# Patient Record
Sex: FEMALE | Race: AMERICAN INDIAN OR ALASKA NATIVE | Employment: OTHER | URBAN - METROPOLITAN AREA
[De-identification: names, ages, dates, MRNs, and addresses within clinical notes are randomized per-mention and may not be internally consistent; named-entity substitution may affect disease eponyms.]

---

## 2017-02-03 ENCOUNTER — CLINICAL SUPPORT (OUTPATIENT)
Dept: INTERNAL MEDICINE CLINIC | Age: 72
End: 2017-02-03

## 2017-02-03 ENCOUNTER — HOSPITAL ENCOUNTER (OUTPATIENT)
Dept: LAB | Age: 72
Discharge: HOME | End: 2017-02-03
Payer: MEDICARE

## 2017-02-03 DIAGNOSIS — Z20.818 MRSA EXPOSURE: Primary | ICD-10-CM

## 2017-02-03 PROCEDURE — 87081 CULTURE SCREEN ONLY: CPT

## 2017-02-03 NOTE — PROGRESS NOTES
Per vo SRJ, pt swabbed for MRSA. Pt mother is inpatient @[facility] and tested positive. Pt  is currently having chemo, and pt has MS. Refer to My Chart messages as well.

## 2017-02-06 LAB — MRSA SPEC QL CULT: NEGATIVE

## 2017-03-03 ENCOUNTER — HOSPITAL ENCOUNTER (EMERGENCY)
Age: 72
Discharge: HOME | End: 2017-03-03
Attending: EMERGENCY MEDICINE
Payer: MEDICARE

## 2017-03-03 ENCOUNTER — APPOINTMENT (OUTPATIENT)
Dept: GENERAL RADIOLOGY | Age: 72
End: 2017-03-03
Attending: EMERGENCY MEDICINE
Payer: MEDICARE

## 2017-03-03 VITALS
TEMPERATURE: 98.3 F | DIASTOLIC BLOOD PRESSURE: 58 MMHG | WEIGHT: 153 LBS | HEIGHT: 63 IN | SYSTOLIC BLOOD PRESSURE: 137 MMHG | RESPIRATION RATE: 18 BRPM | BODY MASS INDEX: 27.11 KG/M2 | HEART RATE: 70 BPM | OXYGEN SATURATION: 97 %

## 2017-03-03 DIAGNOSIS — J11.1 INFLUENZA-LIKE ILLNESS: Primary | ICD-10-CM

## 2017-03-03 DIAGNOSIS — E86.0 DEHYDRATION: ICD-10-CM

## 2017-03-03 LAB
ALBUMIN SERPL BCP-MCNC: 4 G/DL (ref 3.5–5)
ALBUMIN/GLOB SERPL: 1 {RATIO} (ref 1.1–2.2)
ALP SERPL-CCNC: 107 U/L (ref 45–117)
ALT SERPL-CCNC: 48 U/L (ref 12–78)
ANION GAP BLD CALC-SCNC: 7 MMOL/L (ref 5–15)
APPEARANCE UR: CLEAR
AST SERPL W P-5'-P-CCNC: 39 U/L (ref 15–37)
BACTERIA URNS QL MICRO: NEGATIVE /HPF
BASOPHILS # BLD AUTO: 0 K/UL (ref 0–0.1)
BASOPHILS # BLD: 0 % (ref 0–1)
BILIRUB SERPL-MCNC: 0.7 MG/DL (ref 0.2–1)
BILIRUB UR QL: NEGATIVE
BUN SERPL-MCNC: 11 MG/DL (ref 6–20)
BUN/CREAT SERPL: 14 (ref 12–20)
CALCIUM SERPL-MCNC: 9.3 MG/DL (ref 8.5–10.1)
CHLORIDE SERPL-SCNC: 101 MMOL/L (ref 97–108)
CO2 SERPL-SCNC: 24 MMOL/L (ref 21–32)
COLOR UR: ABNORMAL
CREAT SERPL-MCNC: 0.76 MG/DL (ref 0.55–1.02)
EOSINOPHIL # BLD: 0 K/UL (ref 0–0.4)
EOSINOPHIL NFR BLD: 0 % (ref 0–7)
EPITH CASTS URNS QL MICRO: ABNORMAL /LPF
ERYTHROCYTE [DISTWIDTH] IN BLOOD BY AUTOMATED COUNT: 12.7 % (ref 11.5–14.5)
GLOBULIN SER CALC-MCNC: 4 G/DL (ref 2–4)
GLUCOSE SERPL-MCNC: 103 MG/DL (ref 65–100)
GLUCOSE UR STRIP.AUTO-MCNC: NEGATIVE MG/DL
HCT VFR BLD AUTO: 40.2 % (ref 35–47)
HGB BLD-MCNC: 13.6 G/DL (ref 11.5–16)
HGB UR QL STRIP: NEGATIVE
KETONES UR QL STRIP.AUTO: 15 MG/DL
LACTATE SERPL-SCNC: 0.9 MMOL/L (ref 0.4–2)
LEUKOCYTE ESTERASE UR QL STRIP.AUTO: ABNORMAL
LIPASE SERPL-CCNC: 72 U/L (ref 73–393)
LYMPHOCYTES # BLD AUTO: 19 % (ref 12–49)
LYMPHOCYTES # BLD: 1.3 K/UL (ref 0.8–3.5)
MCH RBC QN AUTO: 29.4 PG (ref 26–34)
MCHC RBC AUTO-ENTMCNC: 33.8 G/DL (ref 30–36.5)
MCV RBC AUTO: 86.8 FL (ref 80–99)
MONOCYTES # BLD: 0.2 K/UL (ref 0–1)
MONOCYTES NFR BLD AUTO: 4 % (ref 5–13)
NEUTS SEG # BLD: 5.1 K/UL (ref 1.8–8)
NEUTS SEG NFR BLD AUTO: 77 % (ref 32–75)
NITRITE UR QL STRIP.AUTO: NEGATIVE
PH UR STRIP: 6.5 [PH] (ref 5–8)
PLATELET # BLD AUTO: 233 K/UL (ref 150–400)
POTASSIUM SERPL-SCNC: 4.1 MMOL/L (ref 3.5–5.1)
PROT SERPL-MCNC: 8 G/DL (ref 6.4–8.2)
PROT UR STRIP-MCNC: NEGATIVE MG/DL
RBC # BLD AUTO: 4.63 M/UL (ref 3.8–5.2)
RBC #/AREA URNS HPF: ABNORMAL /HPF (ref 0–5)
SODIUM SERPL-SCNC: 132 MMOL/L (ref 136–145)
SP GR UR REFRACTOMETRY: 1.02 (ref 1–1.03)
UROBILINOGEN UR QL STRIP.AUTO: 1 EU/DL (ref 0.2–1)
WBC # BLD AUTO: 6.7 K/UL (ref 3.6–11)
WBC URNS QL MICRO: ABNORMAL /HPF (ref 0–4)

## 2017-03-03 PROCEDURE — 85025 COMPLETE CBC W/AUTO DIFF WBC: CPT | Performed by: EMERGENCY MEDICINE

## 2017-03-03 PROCEDURE — 81001 URINALYSIS AUTO W/SCOPE: CPT | Performed by: EMERGENCY MEDICINE

## 2017-03-03 PROCEDURE — 87040 BLOOD CULTURE FOR BACTERIA: CPT | Performed by: EMERGENCY MEDICINE

## 2017-03-03 PROCEDURE — 74011250637 HC RX REV CODE- 250/637: Performed by: EMERGENCY MEDICINE

## 2017-03-03 PROCEDURE — 71020 XR CHEST PA LAT: CPT

## 2017-03-03 PROCEDURE — 83690 ASSAY OF LIPASE: CPT | Performed by: EMERGENCY MEDICINE

## 2017-03-03 PROCEDURE — 96361 HYDRATE IV INFUSION ADD-ON: CPT

## 2017-03-03 PROCEDURE — 36415 COLL VENOUS BLD VENIPUNCTURE: CPT | Performed by: EMERGENCY MEDICINE

## 2017-03-03 PROCEDURE — 74011250636 HC RX REV CODE- 250/636: Performed by: EMERGENCY MEDICINE

## 2017-03-03 PROCEDURE — 96374 THER/PROPH/DIAG INJ IV PUSH: CPT

## 2017-03-03 PROCEDURE — 83605 ASSAY OF LACTIC ACID: CPT | Performed by: EMERGENCY MEDICINE

## 2017-03-03 PROCEDURE — 99284 EMERGENCY DEPT VISIT MOD MDM: CPT

## 2017-03-03 PROCEDURE — 80053 COMPREHEN METABOLIC PANEL: CPT | Performed by: EMERGENCY MEDICINE

## 2017-03-03 RX ORDER — ONDANSETRON 8 MG/1
8 TABLET, ORALLY DISINTEGRATING ORAL
Qty: 10 TAB | Refills: 0 | Status: SHIPPED | OUTPATIENT
Start: 2017-03-03 | End: 2017-04-20 | Stop reason: ALTCHOICE

## 2017-03-03 RX ORDER — ACETAMINOPHEN 325 MG/1
975 TABLET ORAL
Status: COMPLETED | OUTPATIENT
Start: 2017-03-03 | End: 2017-03-03

## 2017-03-03 RX ORDER — OSELTAMIVIR PHOSPHATE 75 MG/1
75 CAPSULE ORAL 2 TIMES DAILY
Qty: 9 CAP | Refills: 0 | Status: SHIPPED | OUTPATIENT
Start: 2017-03-03 | End: 2017-04-20 | Stop reason: ALTCHOICE

## 2017-03-03 RX ORDER — OSELTAMIVIR PHOSPHATE 75 MG/1
75 CAPSULE ORAL
Status: COMPLETED | OUTPATIENT
Start: 2017-03-03 | End: 2017-03-03

## 2017-03-03 RX ORDER — IBUPROFEN 600 MG/1
600 TABLET ORAL
Status: COMPLETED | OUTPATIENT
Start: 2017-03-03 | End: 2017-03-03

## 2017-03-03 RX ORDER — ONDANSETRON 2 MG/ML
4 INJECTION INTRAMUSCULAR; INTRAVENOUS
Status: COMPLETED | OUTPATIENT
Start: 2017-03-03 | End: 2017-03-03

## 2017-03-03 RX ADMIN — ONDANSETRON 4 MG: 2 INJECTION INTRAMUSCULAR; INTRAVENOUS at 20:41

## 2017-03-03 RX ADMIN — SODIUM CHLORIDE 1000 ML: 900 INJECTION, SOLUTION INTRAVENOUS at 19:22

## 2017-03-03 RX ADMIN — ACETAMINOPHEN 975 MG: 325 TABLET, FILM COATED ORAL at 18:10

## 2017-03-03 RX ADMIN — OSELTAMIVIR PHOSPHATE 75 MG: 75 CAPSULE ORAL at 20:39

## 2017-03-03 RX ADMIN — IBUPROFEN 600 MG: 600 TABLET, FILM COATED ORAL at 19:22

## 2017-03-03 NOTE — ED PROVIDER NOTES
HPI Comments: 80 y.o. female with past medical history significant for Hypertension, GERD, Asthma, and Osteopenia who presents from Home with chief complaint of Evaluation for Fever. Patient states onset yesterday of cold like symptoms described as fever, cough, and generalized body aches. Pt states gradually worsening symptoms since onset with accompanying vomiting (x1 episode), productive cough of \"cloudy\" \"yellow\" sputum, exertional shortness of breath, and fever of 101.8F. Pt states she has been taking Ibuprofen 800 mg for symptoms - last dose yesterday with no relief. Pt states onset today of rib tenderness that she attributes to constant cough. Pt states aggravation with movement of her trunk as well. Patient states three days ago she had an Avonex Shot. Pt states she often experiences \"flu like symptoms\" after getting this injection however, patient states symptoms presented today are worse and she also has a fever. Pt states recent contact with illness described as \"her  was sick earlier this week\". Pt denies getting Flu shot this year. Pt denies  abdominal pain, nausea, diarrhea, difficulty urinating, or dysuria. Pt denies any other acute medical complaints. Prior to her  getting sick with same sx, their daughter visited last week with same sx. There are no other acute medical concerns at this time. PCP: Gunnar Person MD    Note written by Padmini Kwok, as dictated by Stephany Diggs DO 6:57 PM    The history is provided by the patient.         Past Medical History:   Diagnosis Date    Arthritis 1980    Asthma 1996    Autoimmune disease ([address] 2001    Carpal tunnel syndrome 4/13/2009    GERD (gastroesophageal reflux disease) 4/13/2009    Hyperlipemia 4/13/2009    Hypertension 4/13/2009    MS (multiple sclerosis) ([address] 1/1/2001    MS (multiple sclerosis) ([address]     Osteopenia 4/13/2009       Past Surgical History:   Procedure Laterality Date    HX HYSTERECTOMY  HX LUMBAR DISKECTOMY      HX ORTHOPAEDIC  1976    GA REMOVAL OF OVARIAN CYST(S)           Family History:   Problem Relation Age of Onset    Hypertension Mother     High Cholesterol Mother     Cancer Father      prostate    Asthma Father     High Cholesterol Sister     Cancer Brother      prostate    High Cholesterol Brother     High Cholesterol Sister     Cancer Brother        Social History     Social History    Marital status:      Spouse name: N/A    Number of children: N/A    Years of education: N/A     Occupational History    Not on file. Social History Main Topics    Smoking status: Never Smoker    Smokeless tobacco: Never Used    Alcohol use Yes      Comment: rarely - occ    Drug use: No    Sexual activity: Yes     Partners: Male     Other Topics Concern    Not on file     Social History Narrative         ALLERGIES: Augmentin [amoxicillin-pot clavulanate]; Sulfa (sulfonamide antibiotics); and Zithromax [azithromycin]    Review of Systems   Constitutional: Positive for fever. Respiratory: Positive for cough. Gastrointestinal: Positive for vomiting. Negative for abdominal pain, diarrhea and nausea. Musculoskeletal: Positive for arthralgias (Generalized). All other systems reviewed and are negative. Vitals:    03/03/17 1753   BP: 135/76   Pulse: (!) 101   Resp: 18   Temp: (!) 101.1 °F (38.4 °C)   SpO2: 95%   Weight: 69.4 kg (153 lb)   Height: 5' 3\" (1.6 m)            Physical Exam    Constitutional: Pt is awake and alert. Pt appears well-developed and well-nourished. NAD. HENT:   Head: Normocephalic and atraumatic. Nose: Nose normal.   Mouth/Throat: Oropharynx is clear and moist. No oropharyngeal exudate. Eyes: Conjunctivae and extraocular motions are normal. Pupils are equal, round, and reactive to light. Right eye exhibits no discharge. Left eye exhibits no discharge. No scleral icterus. Neck: No tracheal deviation present.  Supple neck.  Cardiovascular: Normal rate, regular rhythm, normal heart sounds and intact distal pulses. Exam reveals no gallop and no friction rub. No murmur heard. Pulmonary/Chest: Effort normal and breath sounds normal.  Pt  has no wheezes. Pt  has no rales. Abdominal: Soft. Pt  exhibits no distension and no mass. No tenderness. Pt  has no rebound and no guarding. Musculoskeletal:  Pt  exhibits no edema and no tenderness. Ext: Normal ROM in all four extremities; not tender to palpation; distal pulses are normal, no edema. Neurological:  Pt is alert. nonfocal neuro exam.  Skin: Skin is warm and dry. Pt  is not diaphoretic. Psychiatric:  Pt  has a normal mood and affect. Behavior is normal.   Note written by Pop Mcclure, as dictated by Nate Adams, DO 6:58 PM    [facility]    ED Course       Procedures      PROGRESS NOTE:7:24 PM  Provider reviewed chest xray results which were negative          Labs Reviewed   CBC WITH AUTOMATED DIFF - Abnormal; Notable for the following:        Result Value    NEUTROPHILS 77 (*)     MONOCYTES 4 (*)     All other components within normal limits   METABOLIC PANEL, COMPREHENSIVE - Abnormal; Notable for the following:     Sodium 132 (*)     Glucose 103 (*)     AST (SGOT) 39 (*)     A-G Ratio 1.0 (*)     All other components within normal limits   URINALYSIS W/ RFLX MICROSCOPIC - Abnormal; Notable for the following:     Ketone 15 (*)     Leukocyte Esterase SMALL (*)     All other components within normal limits   LIPASE - Abnormal; Notable for the following:     Lipase 72 (*)     All other components within normal limits   CULTURE, BLOOD, PAIRED   LACTIC ACID, PLASMA   SAMPLES BEING HELD     Improved here  Doubt bacterial infection    At [place], pt feels \"100% better. \"  She smiled, did not vomit and looked better too.     Likely has influenza  Emailed her PCP  Plan is empiric tamiflu bc of her age and presence of MS.  zofran prn ODT    D/w pt and her    he is on chemo but got sick prior to her  He is better

## 2017-03-04 NOTE — ED NOTES
MD reviewed discharge instructions and options with patient and patient verbalized understanding. RN reviewed discharge instructions using teachback method. Pt ambulated to exit without difficulty and in no signs of acute distress escorted by fiona, and he  will drive home. No complaints or needs expressed at this time. Patient was counseled on medications prescribed at discharge. Patient to call Dr. Yomaira Camilo in the morning for appointment.

## 2017-03-04 NOTE — DISCHARGE INSTRUCTIONS
We hope that we have addressed all of your medical concerns. The examination and treatment you received in the Emergency Department were for an emergent problem and were not intended as complete care. It is important that you follow up with your healthcare provider(s) for ongoing care. If your symptoms worsen or do not improve as expected, and you are unable to reach your usual health care provider(s), you should return to the Emergency Department. Today's healthcare is undergoing tremendous change, and patient satisfaction surveys are one of the many tools to assess the quality of medical care. You may receive a survey from the [employer] regarding your experience in the Emergency Department. I hope that your experience has been completely positive, particularly the medical care that I provided. As such, please participate in the survey; anything less than excellent does not meet my expectations or intentions. [facility] and [facility] participate in nationally recognized quality of care measures. If your blood pressure is greater than 120/80, as reported below, we urge that you seek medical care to address the potential of high blood pressure, commonly known as hypertension. Hypertension can be hereditary or can be caused by certain medical conditions, pain, stress, or \"white coat syndrome. \"       Please make an appointment with your health care provider(s) for follow up of your Emergency Department visit. VITALS:   Patient Vitals for the past 8 hrs:   Temp Pulse Resp BP SpO2   03/03/17 2144 98.3 °F (36.8 °C) 70 18 137/58 97 %   03/03/17 2007 98.8 °F (37.1 °C) 78 20 150/79 97 %   03/03/17 1922 99.2 °F (37.3 °C) 74 16 134/61 97 %   03/03/17 1753 (!) 101.1 °F (38.4 °C) (!) 101 18 135/76 95 %          Thank you for allowing us to provide you with medical care today.   We realize that you have many choices for your emergency care needs. Please choose us in the future for any continued health care needs. Regards,           Maryanne ErickDO    [facility] [address]   Office: [phone]            Recent Results (from the past 24 hour(s))   CBC WITH AUTOMATED DIFF    Collection Time: 03/03/17  6:07 PM   Result Value Ref Range    WBC 6.7 3.6 - 11.0 K/uL    RBC 4.63 3.80 - 5.20 M/uL    HGB 13.6 11.5 - 16.0 g/dL    HCT 40.2 35.0 - 47.0 %    MCV 86.8 80.0 - 99.0 FL    MCH 29.4 26.0 - 34.0 PG    MCHC 33.8 30.0 - 36.5 g/dL    RDW 12.7 11.5 - 14.5 %    PLATELET 251 729 - 337 K/uL    NEUTROPHILS 77 (H) 32 - 75 %    LYMPHOCYTES 19 12 - 49 %    MONOCYTES 4 (L) 5 - 13 %    EOSINOPHILS 0 0 - 7 %    BASOPHILS 0 0 - 1 %    ABS. NEUTROPHILS 5.1 1.8 - 8.0 K/UL    ABS. LYMPHOCYTES 1.3 0.8 - 3.5 K/UL    ABS. MONOCYTES 0.2 0.0 - 1.0 K/UL    ABS. EOSINOPHILS 0.0 0.0 - 0.4 K/UL    ABS. BASOPHILS 0.0 0.0 - 0.1 K/UL   METABOLIC PANEL, COMPREHENSIVE    Collection Time: 03/03/17  6:07 PM   Result Value Ref Range    Sodium 132 (L) 136 - 145 mmol/L    Potassium 4.1 3.5 - 5.1 mmol/L    Chloride 101 97 - 108 mmol/L    CO2 24 21 - 32 mmol/L    Anion gap 7 5 - 15 mmol/L    Glucose 103 (H) 65 - 100 mg/dL    BUN 11 6 - 20 MG/DL    Creatinine 0.76 0.55 - 1.02 MG/DL    BUN/Creatinine ratio 14 12 - 20      GFR est AA >60 >60 ml/min/1.73m2    GFR est non-AA >60 >60 ml/min/1.73m2    Calcium 9.3 8.5 - 10.1 MG/DL    Bilirubin, total 0.7 0.2 - 1.0 MG/DL    ALT (SGPT) 48 12 - 78 U/L    AST (SGOT) 39 (H) 15 - 37 U/L    Alk.  phosphatase 107 45 - 117 U/L    Protein, total 8.0 6.4 - 8.2 g/dL    Albumin 4.0 3.5 - 5.0 g/dL    Globulin 4.0 2.0 - 4.0 g/dL    A-G Ratio 1.0 (L) 1.1 - 2.2     LACTIC ACID, PLASMA    Collection Time: 03/03/17  6:07 PM   Result Value Ref Range    Lactic acid 0.9 0.4 - 2.0 MMOL/L   LIPASE    Collection Time: 03/03/17  6:07 PM   Result Value Ref Range    Lipase 72 (L) 73 - 393 U/L   URINALYSIS W/ RFLX MICROSCOPIC    Collection Time: 03/03/17 8:07 PM   Result Value Ref Range    Color YELLOW/STRAW      Appearance CLEAR CLEAR      Specific gravity 1.023 1.003 - 1.030      pH (UA) 6.5 5.0 - 8.0      Protein NEGATIVE  NEG mg/dL    Glucose NEGATIVE  NEG mg/dL    Ketone 15 (A) NEG mg/dL    Bilirubin NEGATIVE  NEG      Blood NEGATIVE  NEG      Urobilinogen 1.0 0.2 - 1.0 EU/dL    Nitrites NEGATIVE  NEG      Leukocyte Esterase SMALL (A) NEG      WBC 0-4 0 - 4 /hpf    RBC 0-5 0 - 5 /hpf    Epithelial cells FEW FEW /lpf    Bacteria NEGATIVE  NEG /hpf       Xr Chest Pa Lat    Result Date: 3/3/2017  CLINICAL HISTORY: Cough, fever INDICATION: Cough COMPARISON: 2016 FINDINGS: PA and lateral views of the chest are obtained. The cardiopericardial silhouette is within normal limits. There is no pleural effusion, pneumothorax or focal consolidation present. IMPRESSION: No acute intrathoracic disease. Dehydration: Care Instructions  Your Care Instructions  Dehydration happens when your body loses too much fluid. This might happen when you do not drink enough water or you lose large amounts of fluids from your body because of diarrhea, vomiting, or sweating. Severe dehydration can be life-threatening. Water and minerals called electrolytes help put your body fluids back in balance. Learn the early signs of fluid loss, and drink more fluids to prevent dehydration. Follow-up care is a key part of your treatment and safety. Be sure to make and go to all appointments, and call your doctor if you are having problems. It's also a good idea to know your test results and keep a list of the medicines you take. How can you care for yourself at home? · To prevent dehydration, drink plenty of fluids, enough so that your urine is light yellow or clear like water. Choose water and other caffeine-free clear liquids until you feel better.  If you have kidney, heart, or liver disease and have to limit fluids, talk with your doctor before you increase the amount of fluids you drink. · If you do not feel like eating or drinking, try taking small sips of water, sports drinks, or other rehydration drinks. · Get plenty of rest.  To prevent dehydration  · Add more fluids to your diet and daily routine, unless your doctor has told you not to. · During hot weather, drink more fluids. Drink even more fluids if you exercise a lot. Stay away from drinks with alcohol or caffeine. · Watch for the symptoms of dehydration. These include:  ¨ A dry, sticky mouth. ¨ Dark yellow urine, and not much of it. ¨ Dry and sunken eyes. ¨ Feeling very tired. · Learn what problems can lead to dehydration. These include:  ¨ Diarrhea, fever, and vomiting. ¨ Any illness with a fever, such as pneumonia or the flu. ¨ Activities that cause heavy sweating, such as endurance races and heavy outdoor work in hot or humid weather. ¨ Alcohol or drug abuse or withdrawal.  ¨ Certain medicines, such as cold and allergy pills (antihistamines), diet pills (diuretics), and laxatives. ¨ Certain diseases, such as diabetes, cancer, and heart or kidney disease. When should you call for help? Call 911 anytime you think you may need emergency care. For example, call if:  · You passed out (lost consciousness). Call your doctor now or seek immediate medical care if:  · You are confused and cannot think clearly. · You are dizzy or lightheaded, or you feel like you may faint. · You have signs of needing more fluids. You have sunken eyes and a dry mouth, and you pass only a little dark urine. · You cannot keep fluids down. Watch closely for changes in your health, and be sure to contact your doctor if:  · You are not making tears. · Your skin is very dry and sags slowly back into place after you pinch it. · Your mouth and eyes are very dry. Where can you learn more? Go to http://natalie-hosea.info/. Enter Q861 in the search box to learn more about \"Dehydration: Care Instructions. \"  Current as of: May 27, 2016  Content Version: 11.1  © 2006-2016 [employer]. Care instructions adapted under license by [employer] (which disclaims liability or warranty for this information). If you have questions about a medical condition or this instruction, always ask your healthcare professional. [address] any warranty or liability for your use of this information. Oral Rehydration: Care Instructions  Your Care Instructions  Dehydration occurs when your body loses too much water. This can happen if you do not drink enough fluids or lose a lot of fluid due to diarrhea, vomiting, or sweating. Being dehydrated can cause health problems and can even be life-threatening. To replace lost fluids, you need to drink liquid that contains special chemicals called electrolytes. Electrolytes keep your body working well. Plain water does not have electrolytes. You also need to rest to prevent more fluid loss. Replacing water and electrolytes (oral rehydration) completely takes about 36 hours. But you should feel better within a few hours. Follow-up care is a key part of your treatment and safety. Be sure to make and go to all appointments, and call your doctor if you are having problems. It's also a good idea to know your test results and keep a list of the medicines you take. How can you care for yourself at home? · Take frequent sips of a drink such as Gatorade, Powerade, or other rehydration drinks that your doctor suggests. These replace both fluid and important chemicals (electrolytes) you need for balance in your blood. · Drink 2 quarts of cool liquid over 2 to 4 hours. You should have at least 10 glasses of liquid a day to replace lost fluid. If you have kidney, heart, or liver disease and have to limit fluids, talk with your doctor before you increase the amount of fluids you drink. · Make your own drink. Measure everything carefully.  The drink may not work well or may even be harmful if the amounts are off. ¨ 1 quart water  ¨ ½ teaspoon salt  ¨ 6 teaspoons sugar  · Do not drink liquid with caffeine, such as coffee and isreal. · Do not drink any alcohol. It can make you dehydrated. · Drink plenty of fluids, enough so that your urine is light yellow or clear like water. If you have kidney, heart, or liver disease and have to limit fluids, talk with your doctor before you increase the amount of fluids you drink. When should you call for help? Call 911 anytime you think you may need emergency care. For example, call if:  · You have signs of severe dehydration, such as:  ¨ You are confused or unable to stay awake. ¨ You passed out (lost consciousness). Call your doctor now or seek immediate medical care if:  · You still have signs of dehydration. You have sunken eyes and a dry mouth, and you pass only a little dark urine. · You are dizzy or lightheaded, or you feel like you may faint. · You are not able to keep down fluids. Watch closely for changes in your health, and be sure to contact your doctor if:  · You do not get better as expected. Where can you learn more? Go to http://natalie-hosea.info/. Enter I040 in the search box to learn more about \"Oral Rehydration: Care Instructions. \"  Current as of: May 27, 2016  Content Version: 11.1  © 6357-9893 [employer]. Care instructions adapted under license by [employer] (which disclaims liability or warranty for this information). If you have questions about a medical condition or this instruction, always ask your healthcare professional. [place] [address] any warranty or liability for your use of this information. Influenza (Flu): Care Instructions  Your Care Instructions  Influenza (flu) is an infection in the lungs and breathing passages. It is caused by the influenza virus. There are different strains, or types, of the flu virus from year to year.  Unlike the common cold, the flu comes on suddenly and the symptoms, such as a cough, congestion, fever, chills, fatigue, aches, and pains, are more severe. These symptoms may last up to 10 days. Although the flu can make you feel very sick, it usually doesn't cause serious health problems. Home treatment is usually all you need for flu symptoms. But your doctor may prescribe antiviral medicine to prevent other health problems, such as pneumonia, from developing. Older people and those who have a long-term health condition, such as lung disease, are most at risk for having pneumonia or other health problems. Follow-up care is a key part of your treatment and safety. Be sure to make and go to all appointments, and call your doctor if you are having problems. Its also a good idea to know your test results and keep a list of the medicines you take. How can you care for yourself at home? · Get plenty of rest.  · Drink plenty of fluids, enough so that your urine is light yellow or clear like water. If you have kidney, heart, or liver disease and have to limit fluids, talk with your doctor before you increase the amount of fluids you drink. · Take an over-the-counter pain medicine if needed, such as acetaminophen (Tylenol), ibuprofen (Advil, Motrin), or naproxen (Aleve), to relieve fever, headache, and muscle aches. Read and follow all instructions on the label. No one younger than 20 should take aspirin. It has been linked to Reye syndrome, a serious illness. · Do not smoke. Smoking can make the flu worse. If you need help quitting, talk to your doctor about stop-smoking programs and medicines. These can increase your chances of quitting for good. · Breathe moist air from a hot shower or from a sink filled with hot water to help clear a stuffy nose. · Before you use cough and cold medicines, check the label. These medicines may not be safe for young children or for people with certain health problems.   · If the skin around your nose and lips becomes sore, put some petroleum jelly on the area. · To ease coughing:  ¨ Drink fluids to soothe a scratchy throat. ¨ Suck on cough drops or plain hard candy. ¨ Take an over-the-counter cough medicine that contains dextromethorphan to help you get some sleep. Read and follow all instructions on the label. ¨ Raise your head at night with an extra pillow. This may help you rest if coughing keeps you awake. · Take any prescribed medicine exactly as directed. Call your doctor if you think you are having a problem with your medicine. To avoid spreading the flu  · Wash your hands regularly, and keep your hands away from your face. · Stay home from school, work, and other public places until you are feeling better and your fever has been gone for at least 24 hours. The fever needs to have gone away on its own without the help of medicine. · Ask people living with you to talk to their doctors about preventing the flu. They may get antiviral medicine to keep from getting the flu from you. · To prevent the flu in the future, get a flu vaccine every fall. Encourage people living with you to get the vaccine. · Cover your mouth when you cough or sneeze. When should you call for help? Call 911 anytime you think you may need emergency care. For example, call if:  · You have severe trouble breathing. Call your doctor now or seek immediate medical care if:  · You have new or worse trouble breathing. · You seem to be getting much sicker. · You feel very sleepy or confused. · You have a new or higher fever. · You get a new rash. Watch closely for changes in your health, and be sure to contact your doctor if:  · You begin to get better and then get worse. · You are not getting better after 1 week. Where can you learn more? Go to http://natalie-hosea.info/. Enter Q851 in the search box to learn more about \"Influenza (Flu): Care Instructions. \"  Current as of: May 23, 2016  Content Version: 11.1  © 2274-4399 [employer]. Care instructions adapted under license by [employer] (which disclaims liability or warranty for this information). If you have questions about a medical condition or this instruction, always ask your healthcare professional. [address] any warranty or liability for your use of this information.

## 2017-03-06 NOTE — CALL BACK NOTE
[facility] Emergency Department Follow Up Call Record    Discharged to : Home/Family Home/Home Health/Skilled Facility/Rehab/Assisted Living/Other__home _____  1) Did you receive your discharge instructions? Yes        2) Do you understand them? Yes    Elsy Rust reports feeling \"alittle better today, still weak and not getting good rest.\"     3) Are you able to follow them? Yes          If NO, what can I clarify for you? 4) Do you understand your diagnosis? Yes         5) Do you know which symptoms should prompt you to call the doctor? Yes     6) Were you able to fill and  any medications that were prescribed? Yes     7) You were prescribed _tamiflu, zofran__________for __flu symptoms__________________. Common side effects of this medication are___insomnia, rash, headache_________________. This is not a complete list so please review the forms given from the pharmacy for a complete list.      8) Are there any questions about your medications? Yes Elsy Rust wanted to know if Tamiflu can cause hallucinations as she has been having bad dreams and hallucinated she saw the ocean outside of her window since she has been taking this medication. Discussed contacting her PCP about this as she has emailed. Also encouraged her to drink more fluids . She stopped the Tamiflu today. Waiting on response from her PCP. She also has MS, and she has not been getting good sleep. Have you scheduled any recommended doctors appointments (specialty, PCP) Have emailed her PCP for follow up . If NO, what barriers are you encountering (transportation/lost contact info/cost/  didnt think necessary/no PCP  9) If discharged with Home Health, has the agency contacted you to schedule visit? Not applicable  10) Is there anyone available to help you at home (meals, errands, transportation    monitoring) (adult children, neighbors, private duty companions) Yes   Significant other.    11) Are you on a special diet? No         If YES, do you understand the requirements for this diet? Education provided? 12) If presented with cough, bronchitis, COPD, asthma, is it ok to ask that the   respiratory disease management educator call you? Not applicable      13)  A) If presented with fall, were you issued an assistive device in the ED    Are you using? No  B) If given RX for device, have you obtained? Not applicable       If NO, barriers? C) Therapist recommended:N/A   Are you able to implement the suggestions? No        If NO, barriers to implementation? D) Are you having any difficulties with mobility inside your home?     (steps, bed, tub)Yes This patient does have hx of MS. If YES, ask if the [facility] PT can contact patient and good time and number?  14)  At the end of your discharge instructions, there is information about accessing \[facility]\"" & HEALTH SERVICES, have you had a chance to review those? Yes         Do you have any questions about signing up for this service? No   We encourage our patients to be active participants in their healthcare and this site is one of the ways to do that. It will allow you to access parts of your medical record, email your doctors office, schedule appointments, and request medications refills . 15) Are there any other questions that I can answer for you regarding    your Emergency department visit?  NO             Estimated Call Time:____4:22 PM  _______________ Date/Time:_______________

## 2017-03-08 LAB
BACTERIA SPEC CULT: NORMAL
SERVICE CMNT-IMP: NORMAL

## 2017-04-20 ENCOUNTER — TELEPHONE (OUTPATIENT)
Dept: INTERNAL MEDICINE CLINIC | Age: 72
End: 2017-04-20

## 2017-04-20 ENCOUNTER — OFFICE VISIT (OUTPATIENT)
Dept: INTERNAL MEDICINE CLINIC | Age: 72
End: 2017-04-20

## 2017-04-20 VITALS
OXYGEN SATURATION: 98 % | HEIGHT: 63 IN | TEMPERATURE: 98.7 F | HEART RATE: 79 BPM | RESPIRATION RATE: 14 BRPM | DIASTOLIC BLOOD PRESSURE: 70 MMHG | BODY MASS INDEX: 27.29 KG/M2 | SYSTOLIC BLOOD PRESSURE: 124 MMHG | WEIGHT: 154 LBS

## 2017-04-20 DIAGNOSIS — J01.01 ACUTE RECURRENT MAXILLARY SINUSITIS: Primary | ICD-10-CM

## 2017-04-20 DIAGNOSIS — G35 MS (MULTIPLE SCLEROSIS) (HCC): ICD-10-CM

## 2017-04-20 RX ORDER — DEXTROAMPHETAMINE SACCHARATE, AMPHETAMINE ASPARTATE, DEXTROAMPHETAMINE SULFATE AND AMPHETAMINE SULFATE 2.5; 2.5; 2.5; 2.5 MG/1; MG/1; MG/1; MG/1
TABLET ORAL
COMMUNITY
Start: 2017-04-10 | End: 2018-10-29 | Stop reason: ALTCHOICE

## 2017-04-20 RX ORDER — CEFUROXIME AXETIL 500 MG/1
500 TABLET ORAL 2 TIMES DAILY
Qty: 20 TAB | Refills: 0 | Status: SHIPPED | OUTPATIENT
Start: 2017-04-20 | End: 2018-01-03 | Stop reason: ALTCHOICE

## 2017-04-20 RX ORDER — GUAIFENESIN 600 MG/1
1200 TABLET, EXTENDED RELEASE ORAL 2 TIMES DAILY
COMMUNITY
Start: 2017-04-20 | End: 2018-10-29 | Stop reason: ALTCHOICE

## 2017-04-20 NOTE — MR AVS SNAPSHOT
Visit Information Date & Time Provider Department Dept. Phone Encounter #  
 4/20/2017  1:00 PM Shan Winter MD [facility] Internal Medicine [phone] [id number] Upcoming Health Maintenance Date Due Hepatitis C Screening 1945 Pneumococcal 65+ Low/Medium Risk (2 of 2 - PPSV23) 6/16/2011 GLAUCOMA SCREENING Q2Y 1/1/2017 MEDICARE YEARLY EXAM 7/30/2017 BREAST CANCER SCRN MAMMOGRAM 9/7/2018 COLONOSCOPY 2/18/2024 DTaP/Tdap/Td series (2 - Td) 3/27/2024 Allergies as of 4/20/2017  Review Complete On: 4/20/2017 By: Shan Winter MD  
  
 Severity Noted Reaction Type Reactions Augmentin [Amoxicillin-pot Clavulanate]  07/20/2016    Other (comments) \"sick\" Sulfa (Sulfonamide Antibiotics)  10/26/2009    Hives, Rash Zithromax [Azithromycin]  04/13/2009    Unknown (comments) \"didn't work\" Current Immunizations  Reviewed on 10/4/2013 Name Date Influenza High Dose Vaccine PF 11/6/2015, 11/3/2014 Influenza Vaccine Split 11/29/2010 11:55 AM  
 Pneumococcal Vaccine (Unspecified Type) 11/18/2004 Tdap 3/27/2014  5:48 PM  
  
 Not reviewed this visit You Were Diagnosed With   
  
 Codes Comments Acute recurrent maxillary sinusitis    -  Primary ICD-10-CM: J01.01 
ICD-9-CM: 461.0 MS (multiple sclerosis) ([facility]     ICD-10-CM: G35 
ICD-9-CM: 192 Vitals BP Pulse Temp Resp Height(growth percentile) Weight(growth percentile) 124/70 79 98.7 °F (37.1 °C) (Oral) 14 5' 3\" (1.6 m) 154 lb (69.9 kg) SpO2 BMI OB Status Smoking Status 98% 27.28 kg/m2 Postmenopausal Never Smoker Vitals History BMI and BSA Data Body Mass Index Body Surface Area  
 27.28 kg/m 2 1.76 m 2 Preferred Pharmacy Pharmacy Name Phone [id number] [place], [address] [facility] [phone] Your Updated Medication List  
  
   
 This list is accurate as of: 4/20/17  1:35 PM.  Always use your most recent med list.  
  
  
  
  
 camphor-menthol 0.5-0.5 % lotion Commonly known as:  Kyleigh Products Apply  to affected area as needed for Itching. cefUROXime 500 mg tablet Commonly known as:  CEFTIN Take 1 Tab by mouth two (2) times a day. CHERATUSSIN -10 mg/5 mL solution Generic drug:  guaiFENesin-codeine TAKE 10 ML BY MOUTH FOUR TIMES DAILY AS NEEDED  
  
 dextroamphetamine-amphetamine 10 mg tablet Commonly known as:  ADDERALL Take 1 tablet by mouth daily. Earliest Fill Date: 4/10/17 DULoxetine 30 mg capsule Commonly known as:  CYMBALTA Take 1 Cap by mouth daily. fluticasone 50 mcg/actuation nasal spray Commonly known as:  Lissette Chapito 2 Sprays by Both Nostrils route daily as needed for Rhinitis.  
  
 gabapentin 300 mg capsule Commonly known as:  NEURONTIN Take 1 Cap by mouth three (3) times daily. guaiFENesin  mg ER tablet Commonly known as:  [place] [address] Take 2 Tabs by mouth two (2) times a day. losartan 100 mg tablet Commonly known as:  COZAAR Take 1 Tab by mouth daily. pantoprazole 40 mg tablet Commonly known as:  PROTONIX Take 1 Tab by mouth daily. scopolamine 1.5 mg (1 mg over 3 days) Pt3d Commonly known as:  TRANSDERM-SCOP  
1.5 mg by TransDERmal route daily as needed for Diarrhea (Q72h PRN motion sickness). simvastatin 40 mg tablet Commonly known as:  ZOCOR  
TAKE 1 TABLET BY MOUTH AT BEDTIME  
  
 VITAMIN B-12 PO Take 1 Tab by mouth daily as needed. Patient reports she takes sometimes. VITAMIN D3 2,000 unit Tab Generic drug:  cholecalciferol (vitamin D3) Take 1 Tab by mouth daily. Prescriptions Sent to Pharmacy Refills  
 cefUROXime (CEFTIN) 500 mg tablet 0 Sig: Take 1 Tab by mouth two (2) times a day.   
 Class: Normal  
 Pharmacy: [address], [address] [place] [address] AT [address] #: [phone] Route: Oral  
  
Patient Instructions As discussed in your appointment today, [address] is an important part of planning for your healthcare future. Discussing your preferences with your family and your care team is a part of good healthcare so that we can be guided by your known values and goals. Our office offers this service for you at your convenience. Our Nurse Navigators and certified [employer] Cho[employer] ® Facilitators, Kim Plascencia and Shannon Painter typically schedule family appointments for this service on Wednesdays. To schedule an Advance Care Planning visit or to receive more information about this service, please call [facility] Internal Medicine at [phone] and ask to speak directly to Jeison Singh or [employer]. Advance Care Planning: Care Instructions Your Care Instructions It can be hard to live with an illness that cannot be cured. But if your health is getting worse, you may want to make decisions about end-of-life care. Planning for the end of your life does not mean that you are giving up. It is a way to make sure that your wishes are met. Clearly stating your wishes can make it easier for your loved ones. Making plans while you are still able may also ease your mind and make your final days less stressful and more meaningful. Follow-up care is a key part of your treatment and safety. Be sure to make and go to all appointments, and call your doctor if you are having problems. It's also a good idea to know your test results and keep a list of the medicines you take. What can you do to plan for the end of life? · You can bring these issues up with your doctor. You do not need to wait until your doctor starts the conversation. You might start with \"I would not be willing to live with . Chales Minus Chales Minus Chales Minus \" When you complete this sentence it helps your doctor understand your wishes. · Talk openly and honestly with your doctor.  This is the best way to understand the decisions you will need to make as your health changes. Know that you can always change your mind. · Ask your doctor about commonly used life-support measures. These include tube feedings, breathing machines, and fluids given through a vein (IV). Understanding these treatments will help you decide whether you want them. · You may choose to have these life-supporting treatments for a limited time. This allows a trial period to see whether they will help you. You may also decide that you want your doctor to take only certain measures to keep you alive. It is important to spell out these conditions so that your doctor and family understand them. · Talk to your doctor about how long you are likely to live. He or she may be able to give you an idea of what usually happens with your specific illness. · Think about preparing papers that state your wishes. This way there will not be any confusion about what you want. You can change your instructions at any time. Which papers should you prepare? Advance directives are legal papers that tell doctors how you want to be cared for at the end of your life. You do not need a  to write these papers. Ask your doctor or your state health department for information on how to write your advance directives. They may have the forms for each of these types of papers. Make sure your doctor has a copy of these on file, and give a copy to a family member or close friend. · Consider a do-not-resuscitate order (DNR). This order asks that no extra treatments be done if your heart stops or you stop breathing. Extra treatments may include electrical shock to restart your heart or a machine to breathe for you. If you decide to have a DNR order, ask your doctor to explain and write it. Place the order in your home where everyone can easily see it. · Consider a living will.  A living will explains your wishes in case you are in a coma or cannot communicate. Living goode tell doctors to use or not use treatments that would keep you alive. You must have one or two witnesses or a notary present when you sign this form. · Consider a durable power of . This allows you to name a person to make decisions about your care if you are not able to. Most people ask a close friend or family member. Talk to this person about the kinds of treatments you want and those that you do not want. Make sure this person understands your wishes. If this person is not the health care agent named in your advance directive, also talk with your health care agent. These legal papers are simple to change. Tell your doctor what you want to change, and ask him or her to make a note in your medical file. Give your family updated copies of the papers. Introducing [facility]! Dear Janessa Ye: 
Thank you for requesting a [employer] account. Our records indicate that you already have an active [employer] account. You can access your account anytime at [email or web address]. [email or web address] Did you know that you can access your hospital and ER discharge instructions at any time in [employer]? You can also review all of your test results from your hospital stay or ER visit. Additional Information If you have questions, please visit the Frequently Asked Questions section of the [employer] website at [email or web address] Remember, [employer] is NOT to be used for urgent needs. For medical emergencies, dial 911. Now available from your iPhone and Android! Please provide this summary of care documentation to your next provider. Your primary care clinician is listed as Adarsh [id number] If you have any questions after today's visit, please call [phone].

## 2017-04-20 NOTE — PATIENT INSTRUCTIONS
As discussed in your appointment today, [address] is an important part of planning for your healthcare future. Discussing your preferences with your family and your care team is a part of good healthcare so that we can be guided by your known values and goals. Our office offers this service for you at your convenience. Our Nurse Navigators and certified Respecting Choices ® Facilitators, Georgiana Trinidad typically schedule family appointments for this service on Wednesdays. To schedule an Advance Care Planning visit or to receive more information about this service, please call [facility] [employer] [facility] Internal Medicine at [phone] and ask to speak directly to Snehal Mercado or [employer]. Advance Care Planning: Care Instructions  Your Care Instructions  It can be hard to live with an illness that cannot be cured. But if your health is getting worse, you may want to make decisions about end-of-life care. Planning for the end of your life does not mean that you are giving up. It is a way to make sure that your wishes are met. Clearly stating your wishes can make it easier for your loved ones. Making plans while you are still able may also ease your mind and make your final days less stressful and more meaningful. Follow-up care is a key part of your treatment and safety. Be sure to make and go to all appointments, and call your doctor if you are having problems. It's also a good idea to know your test results and keep a list of the medicines you take. What can you do to plan for the end of life? · You can bring these issues up with your doctor. You do not need to wait until your doctor starts the conversation. You might start with \"I would not be willing to live with . Florence Kent \" When you complete this sentence it helps your doctor understand your wishes. · Talk openly and honestly with your doctor. This is the best way to understand the decisions you will need to make as your health changes.  Know that you can always change your mind. · Ask your doctor about commonly used life-support measures. These include tube feedings, breathing machines, and fluids given through a vein (IV). Understanding these treatments will help you decide whether you want them. · You may choose to have these life-supporting treatments for a limited time. This allows a trial period to see whether they will help you. You may also decide that you want your doctor to take only certain measures to keep you alive. It is important to spell out these conditions so that your doctor and family understand them. · Talk to your doctor about how long you are likely to live. He or she may be able to give you an idea of what usually happens with your specific illness. · Think about preparing papers that state your wishes. This way there will not be any confusion about what you want. You can change your instructions at any time. Which papers should you prepare? Advance directives are legal papers that tell doctors how you want to be cared for at the end of your life. You do not need a  to write these papers. Ask your doctor or your state health department for information on how to write your advance directives. They may have the forms for each of these types of papers. Make sure your doctor has a copy of these on file, and give a copy to a family member or close friend. · Consider a do-not-resuscitate order (DNR). This order asks that no extra treatments be done if your heart stops or you stop breathing. Extra treatments may include electrical shock to restart your heart or a machine to breathe for you. If you decide to have a DNR order, ask your doctor to explain and write it. Place the order in your home where everyone can easily see it. · Consider a living will. A living will explains your wishes in case you are in a coma or cannot communicate. Living goode tell doctors to use or not use treatments that would keep you alive.  You must have one or two witnesses or a notary present when you sign this form. · Consider a durable power of . This allows you to name a person to make decisions about your care if you are not able to. Most people ask a close friend or family member. Talk to this person about the kinds of treatments you want and those that you do not want. Make sure this person understands your wishes. If this person is not the health care agent named in your advance directive, also talk with your health care agent. These legal papers are simple to change. Tell your doctor what you want to change, and ask him or her to make a note in your medical file. Give your family updated copies of the papers.

## 2017-04-20 NOTE — PROGRESS NOTES
Subjective:   Rita Morales is a 80 y.o. female who complains of congestion, post nasal drip, productive cough and bilateral sinus pain for 14 days, gradually worsening since that time. She denies a history of fevers, myalgias, nausea, shortness of breath, sweats, vomiting and wheezing. Has seen the neurology MD at [facility] and her Avonex was stopped. MRI was unchanged. Some ongoing weakness and no falls. Evaluation to date: none. Treatment to date: OTC products. Patient does not smoke cigarettes. Relevant PMH: MS.    Patient Active Problem List    Diagnosis Date Noted    Advanced care planning/counseling discussion 07/29/2016    Neuropathic pain 07/29/2016    Costochondritis 07/29/2016    Chest pain 07/20/2016    GERD (gastroesophageal reflux disease) 04/13/2009    Hyperlipemia 04/13/2009    Osteopenia 04/13/2009    Carpal tunnel syndrome 04/13/2009    Hypertension 04/13/2009    MS (multiple sclerosis) ([email or web address] 01/01/2001     Class: Chronic     Current Outpatient Prescriptions   Medication Sig Dispense Refill    dextroamphetamine-amphetamine (ADDERALL) 10 mg tablet Take 1 tablet by mouth daily. Earliest Fill Date: 4/10/17      cefUROXime (CEFTIN) 500 mg tablet Take 1 Tab by mouth two (2) times a day. 20 Tab 0    guaiFENesin ER (MUCINEX) 600 mg ER tablet Take 2 Tabs by mouth two (2) times a day.  CHERATUSSIN AC  mg/5 mL solution TAKE 10 ML BY MOUTH FOUR TIMES DAILY AS NEEDED 180 mL 0    pantoprazole (PROTONIX) 40 mg tablet Take 1 Tab by mouth daily. 30 Tab 5    losartan (COZAAR) 100 mg tablet Take 1 Tab by mouth daily. 90 Tab 1    DULoxetine (CYMBALTA) 30 mg capsule Take 1 Cap by mouth daily. 90 Cap 2    simvastatin (ZOCOR) 40 mg tablet TAKE 1 TABLET BY MOUTH AT BEDTIME 90 Tab 1    gabapentin (NEURONTIN) 300 mg capsule Take 1 Cap by mouth three (3) times daily.  fluticasone (FLONASE) 50 mcg/actuation nasal spray 2 Sprays by Both Nostrils route daily as needed for Rhinitis.       scopolamine (TRANSDERM-SCOP) 1.5 mg (1 mg over 3 days) pt3d 1.5 mg by TransDERmal route daily as needed for Diarrhea (Q72h PRN motion sickness).  cholecalciferol, vitamin D3, (VITAMIN D3) 2,000 unit tab Take 1 Tab by mouth daily.  camphor-menthol (SARNA) 0.5-0.5 % lotion Apply  to affected area as needed for Itching. 222 mL 0    CYANOCOBALAMIN, VITAMIN B-12, (VITAMIN B-12 PO) Take 1 Tab by mouth daily as needed. Patient reports she takes sometimes. Allergies   Allergen Reactions    Augmentin [Amoxicillin-Pot Clavulanate] Other (comments)     \"sick\"    Sulfa (Sulfonamide Antibiotics) Hives and Rash    Zithromax [Azithromycin] Unknown (comments)     \"didn't work\"     Past Medical History:   Diagnosis Date    Arthritis 1980    Asthma 1996    Autoimmune disease ([facility] 2001    Carpal tunnel syndrome 4/13/2009    GERD (gastroesophageal reflux disease) 4/13/2009    Hyperlipemia 4/13/2009    Hypertension 4/13/2009    MS (multiple sclerosis) ([address] 1/1/2001    MS (multiple sclerosis) ([facility]     Osteopenia 4/13/2009     Past Surgical History:   Procedure Laterality Date    HX HYSTERECTOMY      HX LUMBAR DISKECTOMY      HX ORTHOPAEDIC  1976    AK REMOVAL OF OVARIAN CYST(S)       Family History   Problem Relation Age of Onset    Hypertension Mother     High Cholesterol Mother     Cancer Father      prostate    Asthma Father     High Cholesterol Sister     Cancer Brother      prostate    High Cholesterol Brother     High Cholesterol Sister     Cancer Brother      Social History   Substance Use Topics    Smoking status: Never Smoker    Smokeless tobacco: Never Used    Alcohol use Yes      Comment: rarely - occ        Review of Systems  Pertinent items are noted in HPI.     Objective:     Visit Vitals    /70    Pulse 79    Temp 98.7 °F (37.1 °C) (Oral)    Resp 14    Ht 5' 3\" (1.6 m)    Wt 154 lb (69.9 kg)    SpO2 98%    BMI 27.28 kg/m2     General:  alert, cooperative, no distress   Eyes: negative   Ears: normal TM's and external ear canals AU   Sinuses: tenderness over bilateral maxillary   Mouth:  Lips, mucosa, and tongue normal. Teeth and gums normal   Neck: supple, symmetrical, trachea midline and no adenopathy. Heart: S1 and S2 normal, no murmurs noted. Lungs: clear to auscultation bilaterally   Abdomen: soft, non-tender. Bowel sounds normal. No masses,  no organomegaly        Assessment/Plan:   sinusitis and allergic rhinitis  Plan -   Orders Placed This Encounter    AMB SUPPLY ORDER     Transport Chair. DX: MS G35    dextroamphetamine-amphetamine (ADDERALL) 10 mg tablet     Sig: Take 1 tablet by mouth daily. Earliest Fill Date: 4/10/17    cefUROXime (CEFTIN) 500 mg tablet     Sig: Take 1 Tab by mouth two (2) times a day. Dispense:  20 Tab     Refill:  0    guaiFENesin ER (MUCINEX) 600 mg ER tablet     Sig: Take 2 Tabs by mouth two (2) times a day. Advised her to call back or return to office if symptoms worsen/change/persist.  Discussed expected course/resolution/complications of diagnosis in detail with patient. Medication risks/benefits/costs/interactions/alternatives discussed with patient. She was given an after visit summary which includes diagnoses, current medications, & vitals. She expressed understanding with the diagnosis and plan. .Patient was just seen recently at [facility] neurology for followup of MS. They feel she has progression of her illness and suggested some increase in rest to preserve her strength for short walks with her cane and use a wheelchair for longer trips. She does not have a chair and will provide an order for one. Her weakness has increased in general and specifically in the legs. No falls with injury recently.

## 2017-04-20 NOTE — PROGRESS NOTES
Chief Complaint   Patient presents with    Nasal Congestion    Cough     Goals that were addressed/or need to be completed after this visit:  Health Maintenance Due   Topic    GLAUCOMA SCREENING Q2Y      Faxed request to Dr. Erum Johnston office requesting patients most recent glaucoma screening to be faxed to our office. Fax confirmation received.

## 2017-04-24 DIAGNOSIS — G35 MS (MULTIPLE SCLEROSIS) (HCC): Primary | ICD-10-CM

## 2017-05-17 RX ORDER — LOSARTAN POTASSIUM 100 MG/1
100 TABLET ORAL DAILY
Qty: 90 TAB | Refills: 1 | Status: SHIPPED | COMMUNITY
Start: 2017-05-17 | End: 2017-10-17 | Stop reason: SINTOL

## 2017-05-17 NOTE — TELEPHONE ENCOUNTER
Orders Placed This Encounter    losartan (COZAAR) 100 mg tablet     Sig: Take 1 Tab by mouth daily. Indications: hypertension     Dispense:  90 Tab     Refill:  1     The above medication refills were approved via verbal order by Dr. Jerry Khan.

## 2017-05-25 ENCOUNTER — TELEPHONE (OUTPATIENT)
Dept: INTERNAL MEDICINE CLINIC | Age: 72
End: 2017-05-25

## 2017-06-11 RX ORDER — ESCITALOPRAM OXALATE 10 MG/1
10 TABLET ORAL DAILY
Qty: 90 TAB | Refills: 3 | Status: SHIPPED | COMMUNITY
Start: 2017-06-11 | End: 2018-07-16 | Stop reason: SDUPTHER

## 2017-10-17 RX ORDER — LOSARTAN POTASSIUM 50 MG/1
50 TABLET ORAL DAILY
Qty: 180 TAB | Refills: 3 | Status: SHIPPED | OUTPATIENT
Start: 2017-10-17 | End: 2018-10-26 | Stop reason: SDUPTHER

## 2017-10-30 ENCOUNTER — CLINICAL SUPPORT (OUTPATIENT)
Dept: INTERNAL MEDICINE CLINIC | Age: 72
End: 2017-10-30

## 2017-10-30 DIAGNOSIS — Z23 ENCOUNTER FOR IMMUNIZATION: Primary | ICD-10-CM

## 2017-10-30 NOTE — PROGRESS NOTES
Elsa Ferris  is a 71 y.o.  female  who present for routine immunizations. She  denies any symptoms , reactions or allergies that would exclude them from being immunized today. Risks and adverse reactions were discussed and the VIS was given to them. All questions were addressed. There were no reactions observed.     Selena Berger RN

## 2017-12-29 RX ORDER — OSELTAMIVIR PHOSPHATE 75 MG/1
75 CAPSULE ORAL 2 TIMES DAILY
Qty: 10 CAP | Refills: 0 | Status: SHIPPED | OUTPATIENT
Start: 2017-12-29 | End: 2018-01-03

## 2017-12-29 NOTE — TELEPHONE ENCOUNTER
Refer to My Chart messages     Orders Placed This Encounter    oseltamivir (TAMIFLU) 75 mg capsule     Sig: Take 1 Cap by mouth two (2) times a day for 5 days.      Dispense:  10 Cap     Refill:  0

## 2018-01-03 ENCOUNTER — OFFICE VISIT (OUTPATIENT)
Dept: INTERNAL MEDICINE CLINIC | Age: 73
End: 2018-01-03

## 2018-01-03 VITALS
TEMPERATURE: 98.9 F | HEIGHT: 63 IN | OXYGEN SATURATION: 97 % | DIASTOLIC BLOOD PRESSURE: 62 MMHG | WEIGHT: 152 LBS | SYSTOLIC BLOOD PRESSURE: 127 MMHG | RESPIRATION RATE: 14 BRPM | BODY MASS INDEX: 26.93 KG/M2 | HEART RATE: 78 BPM

## 2018-01-03 DIAGNOSIS — J02.9 SORE THROAT: ICD-10-CM

## 2018-01-03 DIAGNOSIS — J02.0 STREP SORE THROAT: Primary | ICD-10-CM

## 2018-01-03 DIAGNOSIS — R68.89 FLU-LIKE SYMPTOMS: ICD-10-CM

## 2018-01-03 LAB
FLUAV+FLUBV AG NOSE QL IA.RAPID: NEGATIVE POS/NEG
FLUAV+FLUBV AG NOSE QL IA.RAPID: NEGATIVE POS/NEG
S PYO AG THROAT QL: POSITIVE
VALID INTERNAL CONTROL?: YES
VALID INTERNAL CONTROL?: YES

## 2018-01-03 RX ORDER — CEFUROXIME AXETIL 500 MG/1
500 TABLET ORAL 2 TIMES DAILY
Qty: 20 TAB | Refills: 0 | Status: SHIPPED | OUTPATIENT
Start: 2018-01-03 | End: 2018-10-29 | Stop reason: ALTCHOICE

## 2018-01-03 NOTE — PATIENT INSTRUCTIONS

## 2018-01-03 NOTE — MR AVS SNAPSHOT
Visit Information Date & Time Provider Department Dept. Phone Encounter #  
 1/3/2018 11:15 AM Ginny Leonard MD [address] [id number] Internal Medicine [phone] [id number] Follow-up Instructions Return for Wellness Visit. Upcoming Health Maintenance Date Due Hepatitis C Screening 1945 Pneumococcal 65+ Low/Medium Risk (2 of 2 - PPSV23) 6/16/2011 MEDICARE YEARLY EXAM 7/30/2017 GLAUCOMA SCREENING Q2Y 8/22/2018 BREAST CANCER SCRN MAMMOGRAM 9/7/2018 COLONOSCOPY 2/18/2024 DTaP/Tdap/Td series (2 - Td) 3/27/2024 Allergies as of 1/3/2018  Review Complete On: 1/3/2018 By: Apolinar Pimentel LPN Severity Noted Reaction Type Reactions Augmentin [Amoxicillin-pot Clavulanate]  07/20/2016    Other (comments) \"sick\" Sulfa (Sulfonamide Antibiotics)  10/26/2009    Hives, Rash Zithromax [Azithromycin]  04/13/2009    Unknown (comments) \"didn't work\" Current Immunizations  Reviewed on 10/4/2013 Name Date Influenza High Dose Vaccine PF 10/30/2017, 11/6/2015, 11/3/2014 Influenza Vaccine Split 11/29/2010 11:55 AM  
 Tdap 3/27/2014  5:48 PM  
 ZZZ-RETIRED (DO NOT USE) Pneumococcal Vaccine (Unspecified Type) 11/18/2004 Not reviewed this visit You Were Diagnosed With   
  
 Codes Comments Strep sore throat    -  Primary ICD-10-CM: J02.0 ICD-9-CM: 034.0 Sore throat     ICD-10-CM: J02.9 ICD-9-CM: 839 Flu-like symptoms     ICD-10-CM: R68.89 ICD-9-CM: 780.99 Vitals BP Pulse Temp Resp Height(growth percentile) Weight(growth percentile) 127/62 78 98.9 °F (37.2 °C) (Oral) 14 5' 3\" (1.6 m) 152 lb (68.9 kg) SpO2 BMI OB Status Smoking Status 97% 26.93 kg/m2 Postmenopausal Never Smoker Vitals History BMI and BSA Data Body Mass Index Body Surface Area  
 26.93 kg/m 2 1.75 m 2 Preferred Pharmacy Pharmacy Name Phone  [id number] [place], [address] [facility] [phone] Your Updated Medication List  
  
   
This list is accurate as of: 1/3/18 12:08 PM.  Always use your most recent med list.  
  
  
  
  
 camphor-menthol 0.5-0.5 % lotion Commonly known as:  Warren Products Apply  to affected area as needed for Itching. cefUROXime 500 mg tablet Commonly known as:  CEFTIN Take 1 Tab by mouth two (2) times a day. CHERATUSSIN -10 mg/5 mL solution Generic drug:  guaiFENesin-codeine TAKE 10 ML BY MOUTH FOUR TIMES DAILY AS NEEDED  
  
 dextroamphetamine-amphetamine 10 mg tablet Commonly known as:  ADDERALL Take 1 tablet by mouth daily. Earliest Fill Date: 4/10/17  
  
 escitalopram oxalate 10 mg tablet Commonly known as:  Vernal Nicole Take 1 Tab by mouth daily. fluticasone 50 mcg/actuation nasal spray Commonly known as:  Estevan Lover 2 Sprays by Both Nostrils route daily as needed for Rhinitis.  
  
 gabapentin 300 mg capsule Commonly known as:  NEURONTIN Take 400 mg by mouth three (3) times daily. guaiFENesin  mg ER tablet Commonly known as:  Michael & Michael Take 2 Tabs by mouth two (2) times a day. losartan 50 mg tablet Commonly known as:  COZAAR Take 1 Tab by mouth daily. oseltamivir 75 mg capsule Commonly known as:  TAMIFLU Take 1 Cap by mouth two (2) times a day for 5 days. pantoprazole 40 mg tablet Commonly known as:  PROTONIX Take 1 Tab by mouth daily. scopolamine 1 mg Pt3d Commonly known as:  TRANSDERM-SCOP  
1.5 mg by TransDERmal route daily as needed for Diarrhea (Q72h PRN motion sickness). simvastatin 40 mg tablet Commonly known as:  ZOCOR  
TAKE 1 TABLET BY MOUTH AT BEDTIME  
  
 VITAMIN B-12 PO Take 1 Tab by mouth daily as needed. Patient reports she takes sometimes. VITAMIN D3 2,000 unit Tab Generic drug:  cholecalciferol (vitamin D3) Take 1 Tab by mouth daily. Prescriptions Sent to Pharmacy Refills cefUROXime (CEFTIN) 500 mg tablet 0 Sig: Take 1 Tab by mouth two (2) times a day. Class: Normal  
 [facility], [address] [place] [address] #: [phone] Route: Oral  
  
We Performed the Following AMB POC RAPID STREP A [46354 CPT(R)] AMB POC KIARA INFLUENZA A/B TEST [85093 CPT(R)] Follow-up Instructions Return for Wellness Visit. Patient Instructions Preventing Falls: Care Instructions Your Care Instructions Getting around your home safely can be a challenge if you have injuries or health problems that make it easy for you to fall. Loose rugs and furniture in walkways are among the dangers for many older people who have problems walking or who have poor eyesight. People who have conditions such as arthritis, osteoporosis, or dementia also have to be careful not to fall. You can make your home safer with a few simple measures. Follow-up care is a key part of your treatment and safety. Be sure to make and go to all appointments, and call your doctor if you are having problems. It's also a good idea to know your test results and keep a list of the medicines you take. How can you care for yourself at home? Taking care of yourself · You may get dizzy if you do not drink enough water. To prevent dehydration, drink plenty of fluids, enough so that your urine is light yellow or clear like water. Choose water and other caffeine-free clear liquids. If you have kidney, heart, or liver disease and have to limit fluids, talk with your doctor before you increase the amount of fluids you drink. · Exercise regularly to improve your strength, muscle tone, and balance. Walk if you can. Swimming may be a good choice if you cannot walk easily. · Have your vision and hearing checked each year or any time you notice a change. If you have trouble seeing and hearing, you might not be able to avoid objects and could lose your balance. · Know the side effects of the medicines you take. Ask your doctor or pharmacist whether the medicines you take can affect your balance. Sleeping pills or sedatives can affect your balance. · Limit the amount of alcohol you drink. Alcohol can impair your balance and other senses. · Ask your doctor whether calluses or corns on your feet need to be removed. If you wear loose-fitting shoes because of calluses or corns, you can lose your balance and fall. · Talk to your doctor if you have numbness in your feet. Preventing falls at home · Remove raised doorway thresholds, throw rugs, and clutter. Repair loose carpet or raised areas in the floor. · Move furniture and electrical cords to keep them out of walking paths. · Use nonskid floor wax, and wipe up spills right away, especially on ceramic tile floors. · If you use a walker or cane, put rubber tips on it. If you use crutches, clean the bottoms of them regularly with an abrasive pad, such as steel wool. · Keep your house well lit, especially Nona Alvarado, and outside walkways. Use night-lights in areas such as hallways and bathrooms. Add extra light switches or use remote switches (such as switches that go on or off when you clap your hands) to make it easier to turn lights on if you have to get up during the night. · Install sturdy handrails on stairways. · Move items in your cabinets so that the things you use a lot are on the lower shelves (about waist level). · Keep a cordless phone and a flashlight with new batteries by your bed. If possible, put a phone in each of the main rooms of your house, or carry a cell phone in case you fall and cannot reach a phone. Or, you can wear a device around your neck or wrist. You push a button that sends a signal for help. · Wear low-heeled shoes that fit well and give your feet good support. Use footwear with nonskid soles.  Check the heels and soles of your shoes for wear. Repair or replace worn heels or soles. · Do not wear socks without shoes on wood floors. · Walk on the grass when the sidewalks are slippery. If you live in an area that gets snow and ice in the winter, sprinkle salt on slippery steps and sidewalks. Preventing falls in the bath · Install grab bars and nonskid mats inside and outside your shower or tub and near the toilet and sinks. · Use shower chairs and bath benches. · Use a hand-held shower head that will allow you to sit while showering. · Get into a tub or shower by putting the weaker leg in first. Get out of a tub or shower with your strong side first. 
· Repair loose toilet seats and consider installing a raised toilet seat to make getting on and off the toilet easier. · Keep your bathroom door unlocked while you are in the shower. Where can you learn more? Go to http://natalie-hosea.info/. Enter [phone] in the search box to learn more about \"Preventing Falls: Care Instructions. \" Current as of: August 4, 2016 Content Version: 11.2 © 9469-0045 [employer]. Care instructions adapted under license by [employer] (which disclaims liability or warranty for this information). If you have questions about a medical condition or this instruction, always ask your healthcare professional. [place] [address] any warranty or liability for your use of this information. Please remember to bring a copy of your advance medical directive with you to your next appointment so that we may update your chart with this important information. Thank you. Introducing [facility] [place] [facility]! Dear Leonela Rodriguez: 
Thank you for requesting a [employer] account. Our records indicate that you already have an active [employer] account. You can access your account anytime at [email or web address] Did you know that you can access your hospital and ER discharge instructions at any time in [employer]? You can also review all of your test results from your hospital stay or ER visit. Additional Information If you have questions, please visit the Frequently Asked Questions section of the [employer] website at [email or web address] Remember, [employer] is NOT to be used for urgent needs. For medical emergencies, dial 911. Now available from your iPhone and Android! Please provide this summary of care documentation to your next provider. Your primary care clinician is listed as [facility] [phone] If you have any questions after today's visit, please call [phone].

## 2018-01-03 NOTE — PROGRESS NOTES
Subjective:   Blanquita Steward is a 71 y.o. female who complains of congestion, sore throat, post nasal drip, productive cough, bilateral sinus pain, fever, chills and brown nasal discharge for 10 days, gradually worsening since that time. She denies a history of shortness of breath, sweats, vomiting and wheezing. Evaluation to date: She called in last week and was treated empirically with Tamiflu as her  is in hospice. That did seem to help her aches and pains last week but the headaches and purulent congestion have continued. .   Treatment to date: per above. Patient does not smoke cigarettes. Relevant PMH: No pertinent additional PMH. Patient Active Problem List    Diagnosis Date Noted    Advanced care planning/counseling discussion 07/29/2016    Neuropathic pain 07/29/2016    Costochondritis 07/29/2016    Chest pain 07/20/2016    GERD (gastroesophageal reflux disease) 04/13/2009    Hyperlipemia 04/13/2009    Osteopenia 04/13/2009    Carpal tunnel syndrome 04/13/2009    Hypertension 04/13/2009    MS (multiple sclerosis) ([facility] 01/01/2001     Class: Chronic     Current Outpatient Prescriptions   Medication Sig Dispense Refill    cefUROXime (CEFTIN) 500 mg tablet Take 1 Tab by mouth two (2) times a day. 20 Tab 0    losartan (COZAAR) 50 mg tablet Take 1 Tab by mouth daily. 180 Tab 3    escitalopram oxalate (LEXAPRO) 10 mg tablet Take 1 Tab by mouth daily. 90 Tab 3    dextroamphetamine-amphetamine (ADDERALL) 10 mg tablet Take 1 tablet by mouth daily. Earliest Fill Date: 4/10/17      CHERATUSSIN AC  mg/5 mL solution TAKE 10 ML BY MOUTH FOUR TIMES DAILY AS NEEDED 180 mL 0    simvastatin (ZOCOR) 40 mg tablet TAKE 1 TABLET BY MOUTH AT BEDTIME 90 Tab 1    gabapentin (NEURONTIN) 300 mg capsule Take 400 mg by mouth three (3) times daily.  fluticasone (FLONASE) 50 mcg/actuation nasal spray 2 Sprays by Both Nostrils route daily as needed for Rhinitis.       scopolamine (TRANSDERM-SCOP) 1.5 mg (1 mg over 3 days) pt3d 1.5 mg by TransDERmal route daily as needed for Diarrhea (Q72h PRN motion sickness).  CYANOCOBALAMIN, VITAMIN B-12, (VITAMIN B-12 PO) Take 1 Tab by mouth daily as needed. Patient reports she takes sometimes.  cholecalciferol, vitamin D3, (VITAMIN D3) 2,000 unit tab Take 1 Tab by mouth daily.  oseltamivir (TAMIFLU) 75 mg capsule Take 1 Cap by mouth two (2) times a day for 5 days. 10 Cap 0    guaiFENesin ER (MUCINEX) 600 mg ER tablet Take 2 Tabs by mouth two (2) times a day.  pantoprazole (PROTONIX) 40 mg tablet Take 1 Tab by mouth daily. 30 Tab 5    camphor-menthol (SARNA) 0.5-0.5 % lotion Apply  to affected area as needed for Itching.  222 mL 0     Allergies   Allergen Reactions    Augmentin [Amoxicillin-Pot Clavulanate] Other (comments)     \"sick\"    Sulfa (Sulfonamide Antibiotics) Hives and Rash    Zithromax [Azithromycin] Unknown (comments)     \"didn't work\"     Past Medical History:   Diagnosis Date    Arthritis 1980    Asthma 1996    Autoimmune disease ([facility] 2001    Carpal tunnel syndrome 4/13/2009    GERD (gastroesophageal reflux disease) 4/13/2009    Hyperlipemia 4/13/2009    Hypertension 4/13/2009    MS (multiple sclerosis) ([facility] 1/1/2001    Osteopenia 4/13/2009     Past Surgical History:   Procedure Laterality Date    HX HYSTERECTOMY      HX LUMBAR DISKECTOMY      HX ORTHOPAEDIC  1976    DE REMOVAL OF OVARIAN CYST(S)       Family History   Problem Relation Age of Onset    Hypertension Mother     High Cholesterol Mother     Cancer Father      prostate    Asthma Father     High Cholesterol Sister     Cancer Brother      prostate    High Cholesterol Brother     High Cholesterol Sister     Cancer Brother      Social History   Substance Use Topics    Smoking status: Never Smoker    Smokeless tobacco: Never Used    Alcohol use Yes      Comment: rarely - occ        Review of Systems  Pertinent items are noted in HPI.    Objective:     Visit Vitals    /62    Pulse 78    Temp 98.9 °F (37.2 °C) (Oral)    Resp 14    Ht 5' 3\" (1.6 m)    Wt 152 lb (68.9 kg)    SpO2 97%    BMI 26.93 kg/m2     General:  alert, cooperative, no distress   Eyes: negative   Ears: normal TM's and external ear canals AU   Sinuses: tenderness over bilateral maxillary   Mouth:  Lips, mucosa, and tongue normal. Teeth and gums normal and abnormal findings: mild oropharyngeal erythema   Neck: supple, symmetrical, trachea midline and mild anterior cervical adenopathy. Heart: S1 and S2 normal, no murmurs noted. Lungs: clear to auscultation bilaterally   Abdomen: soft, non-tender. Bowel sounds normal. No masses,  no organomegaly        Assessment/Plan:   influenza and strep pharyngitis  Plan -   Orders Placed This Encounter    AMB POC RAPID STREP A    AMB POC KIARA INFLUENZA A/B TEST    cefUROXime (CEFTIN) 500 mg tablet   Advised her to call back or return to office if symptoms worsen/change/persist.  Discussed expected course/resolution/complications of diagnosis in detail with patient. Medication risks/benefits/costs/interactions/alternatives discussed with patient. She was given an after visit summary which includes diagnoses, current medications, & vitals. She expressed understanding with the diagnosis and plan.

## 2018-01-28 RX ORDER — LEVOFLOXACIN 500 MG/1
500 TABLET, FILM COATED ORAL DAILY
Qty: 7 TAB | Refills: 0 | Status: SHIPPED | OUTPATIENT
Start: 2018-01-28 | End: 2018-10-29 | Stop reason: ALTCHOICE

## 2018-04-19 DIAGNOSIS — J30.1 NON-SEASONAL ALLERGIC RHINITIS DUE TO POLLEN: Primary | ICD-10-CM

## 2018-04-19 RX ORDER — CODEINE PHOSPHATE AND GUAIFENESIN 10; 100 MG/5ML; MG/5ML
SOLUTION ORAL
Qty: 180 ML | Refills: 0 | OUTPATIENT
Start: 2018-04-19 | End: 2018-04-23 | Stop reason: SDUPTHER

## 2018-04-20 NOTE — TELEPHONE ENCOUNTER
Orders Placed This Encounter    guaiFENesin-codeine (CHERATUSSIN AC) 100-10 mg/5 mL solution     Sig: TAKE 10 ML BY MOUTH FOUR TIMES DAILY AS NEEDED  Indications: Cough     Dispense:  180 mL     Refill:  0     This request is for a new prescription for a controlled substance as required by Federal/State law. The above controlled substance refill was called into patients pharmacy - [facility]/ - authorized by Dr. Beatrice Vera.

## 2018-04-23 RX ORDER — CODEINE PHOSPHATE AND GUAIFENESIN 10; 100 MG/5ML; MG/5ML
10 SOLUTION ORAL
Qty: 180 ML | Refills: 0 | OUTPATIENT
Start: 2018-04-23 | End: 2019-05-20 | Stop reason: SDUPTHER

## 2018-04-23 NOTE — TELEPHONE ENCOUNTER
Orders Placed This Encounter    DISCONTD: guaiFENesin-codeine (CHERATUSSIN AC) 100-10 mg/5 mL solution     Sig: TAKE 10 ML BY MOUTH FOUR TIMES DAILY AS NEEDED  Indications: Cough     Dispense:  180 mL     Refill:  0     This request is for a new prescription for a controlled substance as required by Federal/State law.  guaiFENesin-codeine (CHERATUSSIN AC) 100-10 mg/5 mL solution     Sig: Take 10 mL by mouth four (4) times daily as needed for Cough. Max Daily Amount: 40 mL. Indications: Cough     Dispense:  180 mL     Refill:  0     This request is for a new prescription for a controlled substance as required by Federal/State law. The above controlled substance refill was called into patients pharmacy -[facility]/ - authorized by Dr. Brenda Patel.

## 2018-07-16 RX ORDER — ESCITALOPRAM OXALATE 10 MG/1
10 TABLET ORAL DAILY
Qty: 90 TAB | Refills: 1 | Status: SHIPPED | COMMUNITY
Start: 2018-07-16 | End: 2019-01-21 | Stop reason: SDUPTHER

## 2018-07-16 NOTE — TELEPHONE ENCOUNTER
Orders Placed This Encounter    escitalopram oxalate (LEXAPRO) 10 mg tablet     Sig: Take 1 Tab by mouth daily. Dispense:  90 Tab     Refill:  1     The above medication refills were approved via verbal order by Dr. Reyes Meyer III.

## 2018-08-08 RX ORDER — SIMVASTATIN 40 MG/1
40 TABLET, FILM COATED ORAL
Qty: 90 TAB | Refills: 1 | Status: SHIPPED | OUTPATIENT
Start: 2018-08-08 | End: 2019-02-04 | Stop reason: SDUPTHER

## 2018-08-08 NOTE — TELEPHONE ENCOUNTER
Orders Placed This Encounter    simvastatin (ZOCOR) 40 mg tablet     Sig: Take 1 Tab by mouth nightly. Dispense:  90 Tab     Refill:  1     The above medication refills were approved via verbal order by Dr. Villa Castorena III.

## 2018-10-26 RX ORDER — LOSARTAN POTASSIUM 50 MG/1
50 TABLET ORAL DAILY
Qty: 180 TAB | Refills: 1 | Status: SHIPPED | COMMUNITY
Start: 2018-10-26 | End: 2019-01-15 | Stop reason: ALTCHOICE

## 2018-10-26 NOTE — TELEPHONE ENCOUNTER
Orders Placed This Encounter    losartan (COZAAR) 50 mg tablet     Sig: Take 1 Tab by mouth daily. Dispense:  180 Tab     Refill:  1     The above medication refills were approved via verbal order by Dr. Zelpha Frankel III.

## 2018-10-29 ENCOUNTER — OFFICE VISIT (OUTPATIENT)
Dept: INTERNAL MEDICINE CLINIC | Age: 73
End: 2018-10-29

## 2018-10-29 VITALS
OXYGEN SATURATION: 98 % | HEART RATE: 84 BPM | SYSTOLIC BLOOD PRESSURE: 144 MMHG | HEIGHT: 63 IN | BODY MASS INDEX: 25.69 KG/M2 | WEIGHT: 145 LBS | TEMPERATURE: 98.6 F | RESPIRATION RATE: 12 BRPM | DIASTOLIC BLOOD PRESSURE: 78 MMHG

## 2018-10-29 DIAGNOSIS — R11.0 NAUSEA: ICD-10-CM

## 2018-10-29 DIAGNOSIS — J01.01 ACUTE RECURRENT MAXILLARY SINUSITIS: Primary | ICD-10-CM

## 2018-10-29 DIAGNOSIS — G35 MS (MULTIPLE SCLEROSIS) (HCC): ICD-10-CM

## 2018-10-29 DIAGNOSIS — Z23 ENCOUNTER FOR IMMUNIZATION: ICD-10-CM

## 2018-10-29 DIAGNOSIS — K59.01 CONSTIPATION DUE TO SLOW TRANSIT: ICD-10-CM

## 2018-10-29 PROBLEM — G47.33 OSA (OBSTRUCTIVE SLEEP APNEA): Status: ACTIVE | Noted: 2018-10-29

## 2018-10-29 PROBLEM — R53.83 FATIGUE DUE TO SLEEP PATTERN DISTURBANCE: Status: ACTIVE | Noted: 2018-10-29

## 2018-10-29 PROBLEM — G47.9 FATIGUE DUE TO SLEEP PATTERN DISTURBANCE: Status: ACTIVE | Noted: 2018-10-29

## 2018-10-29 RX ORDER — CEFUROXIME AXETIL 500 MG/1
500 TABLET ORAL 2 TIMES DAILY
Qty: 20 TAB | Refills: 0 | Status: SHIPPED | OUTPATIENT
Start: 2018-10-29 | End: 2019-01-28 | Stop reason: ALTCHOICE

## 2018-10-29 RX ORDER — ONDANSETRON 4 MG/1
4 TABLET, ORALLY DISINTEGRATING ORAL
Qty: 20 TAB | Refills: 0 | Status: SHIPPED | OUTPATIENT
Start: 2018-10-29 | End: 2019-08-21 | Stop reason: ALTCHOICE

## 2018-10-29 RX ORDER — GABAPENTIN 400 MG/1
400 CAPSULE ORAL
COMMUNITY
Start: 2017-11-30 | End: 2019-05-20 | Stop reason: SDUPTHER

## 2018-10-29 RX ORDER — DEXTROAMPHETAMINE SACCHARATE, AMPHETAMINE ASPARTATE, DEXTROAMPHETAMINE SULFATE AND AMPHETAMINE SULFATE 2.5; 2.5; 2.5; 2.5 MG/1; MG/1; MG/1; MG/1
10 TABLET ORAL
COMMUNITY
Start: 2018-04-19 | End: 2019-05-20 | Stop reason: SDUPTHER

## 2018-10-29 RX ORDER — POLYETHYLENE GLYCOL 3350 17 G/17G
17 POWDER, FOR SOLUTION ORAL DAILY
COMMUNITY
Start: 2018-10-29 | End: 2019-08-21 | Stop reason: ALTCHOICE

## 2018-10-29 RX ORDER — CETIRIZINE HCL 10 MG
10 TABLET ORAL
COMMUNITY
Start: 2018-10-29 | End: 2022-10-07 | Stop reason: ALTCHOICE

## 2018-10-29 NOTE — PROGRESS NOTES
HPI:  Lynn Ascencio is a 68y.o. year old female who is here for Several issues. She has had a several week history of ongoing nasal congestion with postnasal drip. She has had intermittent yellow nasal discharge as well as sinus pressure. She has been using saline nasal spray and Flonase with some relief. She has itchy irritated eyes with some rash below her eyes and also had some itching rash on her left forearm. Denies fevers or chills. Denies chest pains or shortness of breath. She has had some ongoing issues with nausea but no vomiting. No melena or hematochezia. She has ongoing problems with constipation and is been taking intermittent laxatives for that. She has ongoing weakness in her right leg and right arm. She is going to see neurology at [facility] this week for follow-up of her MS. She is recently lost her  but does seem to be getting along well with that. She does not feel depressed or feel she is unusually grieving. Past Medical History:   Diagnosis Date    Arthritis 1980    Asthma 1996    Autoimmune disease ([facility] [address] 2001    Carpal tunnel syndrome 4/13/2009    Chronic pain     GERD (gastroesophageal reflux disease) 4/13/2009    Hyperlipemia 4/13/2009    Hypertension 4/13/2009    MS (multiple sclerosis) ([facility] [address] 1/1/2001    Osteopenia 4/13/2009       Past Surgical History:   Procedure Laterality Date    HX HYSTERECTOMY      HX LUMBAR DISKECTOMY      HX ORTHOPAEDIC  1976    [place] REMOVAL OF OVARIAN CYST(S)         Prior to Admission medications    Medication Sig Start Date End Date Taking? Authorizing Provider   dextroamphetamine-amphetamine (ADDERALL) 10 mg tablet Take 10 mg by mouth. 4/19/18  Yes Provider, Historical   gabapentin (NEURONTIN) 400 mg capsule Take 400 mg by mouth. 11/30/17  Yes Provider, Historical   polyethylene glycol (MIRALAX) 17 gram packet Take 1 Packet by mouth daily.  10/29/18  Yes Nii Owusu MD   cetirizine (ZYRTEC) 10 mg tablet Take 1 Tab by mouth nightly. 10/29/18  Yes Trina Cabrales MD   cefUROXime (CEFTIN) 500 mg tablet Take 1 Tab by mouth two (2) times a day. 10/29/18  Yes Trina Cabrales MD   ondansetron (ZOFRAN ODT) 4 mg disintegrating tablet Take 1 Tab by mouth every eight (8) hours as needed for Nausea. 10/29/18  Yes Trina Cabrales MD   losartan (COZAAR) 50 mg tablet Take 1 Tab by mouth daily. 10/26/18  Yes Trina Cabrales MD   simvastatin (ZOCOR) 40 mg tablet Take 1 Tab by mouth nightly. 8/8/18  Yes Trina Cabrales MD   escitalopram oxalate (LEXAPRO) 10 mg tablet Take 1 Tab by mouth daily. 7/16/18  Yes Trina Cabrales MD   guaiFENesin-codeine [facility]) 100-10 mg/5 mL solution Take 10 mL by mouth four (4) times daily as needed for Cough. Max Daily Amount: 40 mL. Indications: Cough 4/23/18  Yes Trina Cabrales MD   fluticasone [facility]) 50 mcg/actuation nasal spray 2 Sprays by Both Nostrils route daily as needed for Rhinitis. Yes Provider, Historical   camphor-menthol (SARNA) 0.5-0.5 % lotion Apply  to affected area as needed for Itching. 1/14/16  Yes Trina Cabrales MD   cholecalciferol, vitamin D3, (VITAMIN D3) 2,000 unit tab Take 1 Tab by mouth daily.    Yes Provider, Historical       Social History     Socioeconomic History    Marital status:      Spouse name: Not on file    Number of children: Not on file    Years of education: Not on file    Highest education level: Not on file   Social Needs    Financial resource strain: Not on file    Food insecurity - worry: Not on file    Food insecurity - inability: Not on file    Transportation needs - medical: Not on file   [employer] needs - non-medical: Not on file   Occupational History    Not on file   Tobacco Use    Smoking status: Never Smoker    Smokeless tobacco: Never Used   Substance and Sexual Activity    Alcohol use: Yes     Comment: rarely - occ    Drug use: No    Sexual activity: Not Currently     Partners: Male   Other Topics Concern    Not on file   Social History Narrative    Not on file          ROS  Per HPI    Visit Vitals  /78   Pulse 84   Temp 98.6 °F (37 °C) (Oral)   Resp 12   Ht 5' 3\" (1.6 m)   Wt 145 lb (65.8 kg)   SpO2 98%   BMI 25.69 kg/m²         Physical Exam   Physical Examination: General appearance - alert, well appearing, and in no distress  Eyes - pupils equal and reactive, extraocular eye movements intact  Ears - bilateral TM's and external ear canals normal  Nose - sinus tenderness noted bilateral maxillary  Mouth - mucous membranes moist, pharynx normal without lesions  Neck - supple, no significant adenopathy  Lymphatics - no palpable lymphadenopathy, no hepatosplenomegaly  Chest - clear to auscultation, no wheezes, rales or rhonchi, symmetric air entry  Heart - normal rate, regular rhythm, normal S1, S2, no murmurs, rubs, clicks or gallops  Abdomen - soft, nontender, nondistended, no masses or organomegaly  Neurological - abnormal neurological exam unchanged from prior examinations  Musculoskeletal - no joint tenderness, deformity or swelling  Extremities - peripheral pulses normal, no pedal edema, no clubbing or cyanosis      Assessment/Plan:  Diagnoses and all orders for this visit:    1. Acute recurrent maxillary sinusitis -with allergic rhinitis. Will treat with Flonase as well as Zyrtec. We will also give her a round of antibiotics to see if this is helpful. If not improved would consider oral prednisone. 2. Encounter for immunization  -     ADMIN INFLUENZA VIRUS VAC  -     INFLUENZA VACCINE INACTIVATED (IIV), SUBUNIT, ADJUVANTED, IM    3. Nausea -likely due to slow transit out of her stomach. She is previously taking Protonix with limited success. At this point will try low residue diet. She was instructed in that today. We will also give her some Zofran to use as needed. 4. MS (multiple sclerosis) ([facility] [address] -per neurology.     5. Constipation due to slow transit -likely related to the above. Will add MiraLAX and see if this is helpful. If not would consider fiber gummy's as well. Other orders  -     cefUROXime (CEFTIN) 500 mg tablet; Take 1 Tab by mouth two (2) times a day. -     ondansetron (ZOFRAN ODT) 4 mg disintegrating tablet; Take 1 Tab by mouth every eight (8) hours as needed for Nausea. Follow-up Disposition:  Return in about 6 months (around 4/29/2019) for Wellness Visit, CPE. Advised her to call back or return to office if symptoms worsen/change/persist.  Discussed expected course/resolution/complications of diagnosis in detail with patient. Medication risks/benefits/costs/interactions/alternatives discussed with patient. She was given an after visit summary which includes diagnoses, current medications, & vitals. She expressed understanding with the diagnosis and plan.

## 2019-01-15 RX ORDER — IRBESARTAN 150 MG/1
150 TABLET ORAL
Qty: 30 TAB | Refills: 5 | Status: SHIPPED | OUTPATIENT
Start: 2019-01-15 | End: 2019-01-22 | Stop reason: SDUPTHER

## 2019-01-21 RX ORDER — ESCITALOPRAM OXALATE 10 MG/1
10 TABLET ORAL DAILY
Qty: 90 TAB | Refills: 1 | Status: SHIPPED | COMMUNITY
Start: 2019-01-21 | End: 2019-07-24 | Stop reason: SDUPTHER

## 2019-01-21 NOTE — TELEPHONE ENCOUNTER
Orders Placed This Encounter    escitalopram oxalate (LEXAPRO) 10 mg tablet     Sig: Take 1 Tab by mouth daily. Dispense:  90 Tab     Refill:  1     The above orders were approved via VORB per Dr. Leonela Landaverde, III.

## 2019-01-22 RX ORDER — IRBESARTAN 150 MG/1
150 TABLET ORAL
Qty: 90 TAB | Refills: 1 | Status: SHIPPED | COMMUNITY
Start: 2019-01-22 | End: 2019-01-28 | Stop reason: CLARIF

## 2019-01-22 NOTE — TELEPHONE ENCOUNTER
Orders Placed This Encounter    irbesartan (AVAPRO) 150 mg tablet     Sig: Take 1 Tab by mouth nightly. Dispense:  90 Tab     Refill:  1     The above orders were approved via VORB per Dr. Isha Morelos, III.

## 2019-01-23 ENCOUNTER — TELEPHONE (OUTPATIENT)
Dept: INTERNAL MEDICINE CLINIC | Age: 74
End: 2019-01-23

## 2019-01-28 ENCOUNTER — OFFICE VISIT (OUTPATIENT)
Dept: INTERNAL MEDICINE CLINIC | Age: 74
End: 2019-01-28

## 2019-01-28 VITALS
RESPIRATION RATE: 12 BRPM | HEART RATE: 82 BPM | WEIGHT: 146 LBS | BODY MASS INDEX: 25.87 KG/M2 | OXYGEN SATURATION: 97 % | SYSTOLIC BLOOD PRESSURE: 144 MMHG | DIASTOLIC BLOOD PRESSURE: 75 MMHG | HEIGHT: 63 IN | TEMPERATURE: 98.1 F

## 2019-01-28 DIAGNOSIS — J32.0 CHRONIC MAXILLARY SINUSITIS: Primary | ICD-10-CM

## 2019-01-28 RX ORDER — GUAIFENESIN 600 MG/1
600 TABLET, EXTENDED RELEASE ORAL
COMMUNITY
Start: 2019-01-28 | End: 2019-08-21 | Stop reason: ALTCHOICE

## 2019-01-28 RX ORDER — LOSARTAN POTASSIUM 50 MG/1
50 TABLET ORAL DAILY
COMMUNITY
Start: 2018-10-26 | End: 2019-12-30

## 2019-01-28 RX ORDER — FLUTICASONE PROPIONATE 50 MCG
2 SPRAY, SUSPENSION (ML) NASAL
Qty: 1 BOTTLE | COMMUNITY
Start: 2019-01-28 | End: 2020-08-06 | Stop reason: SDUPTHER

## 2019-01-28 RX ORDER — CEFUROXIME AXETIL 500 MG/1
500 TABLET ORAL 2 TIMES DAILY
Qty: 20 TAB | Refills: 0 | Status: SHIPPED | OUTPATIENT
Start: 2019-01-28 | End: 2019-08-21 | Stop reason: ALTCHOICE

## 2019-01-28 NOTE — PROGRESS NOTES
Subjective:   Marbella Maciel is a 68 y.o. female who complains of congestion, sore throat, post nasal drip, productive cough and yellow nasal discharge for 7 days, gradually worsening since that time. She denies a history of shortness of breath and wheezing. Evaluation to date: none. Treatment to date: OTC products. Patient does not smoke cigarettes. Relevant PMH: MS.    Patient Active Problem List    Diagnosis Date Noted    MS (multiple sclerosis) ([facility] [address] 01/01/2001     Priority: 1 - One     Class: Chronic    Fatigue due to sleep pattern disturbance 10/29/2018    SHANNAN (obstructive sleep apnea) 10/29/2018    Multiple sclerosis ([facility] [address] 11/16/2016    Advanced care planning/counseling discussion 07/29/2016    Neuropathic pain 07/29/2016    Costochondritis 07/29/2016    Chest pain 07/20/2016    GERD (gastroesophageal reflux disease) 04/13/2009    Hyperlipemia 04/13/2009    Osteopenia 04/13/2009    Carpal tunnel syndrome 04/13/2009    Hypertension 04/13/2009     Current Outpatient Medications   Medication Sig Dispense Refill    losartan (COZAAR) 50 mg tablet Take 50 mg by mouth daily.  cefUROXime (CEFTIN) 500 mg tablet Take 1 Tab by mouth two (2) times a day. 20 Tab 0    fluticasone (FLONASE) 50 mcg/actuation nasal spray 2 Sprays by Both Nostrils route two (2) times daily as needed for Rhinitis. 1 Bottle     guaiFENesin ER (MUCINEX) 600 mg ER tablet Take 1 Tab by mouth two (2) times a day.  escitalopram oxalate (LEXAPRO) 10 mg tablet Take 1 Tab by mouth daily. 90 Tab 1    dextroamphetamine-amphetamine (ADDERALL) 10 mg tablet Take 10 mg by mouth.  gabapentin (NEURONTIN) 400 mg capsule Take 400 mg by mouth.  cetirizine (ZYRTEC) 10 mg tablet Take 1 Tab by mouth nightly.  simvastatin (ZOCOR) 40 mg tablet Take 1 Tab by mouth nightly. 90 Tab 1    guaiFENesin-codeine (CHERATUSSIN AC) 100-10 mg/5 mL solution Take 10 mL by mouth four (4) times daily as needed for Cough.  Max Daily Amount: 40 mL. Indications: Cough 180 mL 0    camphor-menthol (SARNA) 0.5-0.5 % lotion Apply  to affected area as needed for Itching. 222 mL 0    cholecalciferol, vitamin D3, (VITAMIN D3) 2,000 unit tab Take 1 Tab by mouth daily.  polyethylene glycol (MIRALAX) 17 gram packet Take 1 Packet by mouth daily.  ondansetron (ZOFRAN ODT) 4 mg disintegrating tablet Take 1 Tab by mouth every eight (8) hours as needed for Nausea. 20 Tab 0     Allergies   Allergen Reactions    Augmentin [Amoxicillin-Pot Clavulanate] Other (comments)     \"sick\"    Sulfa (Sulfonamide Antibiotics) Hives and Rash    Zithromax [Azithromycin] Unknown (comments)     \"didn't work\"     Past Medical History:   Diagnosis Date    Arthritis 1980    Asthma 1996    Autoimmune disease ([facility] [address] 2001    Carpal tunnel syndrome 4/13/2009    Chronic pain     GERD (gastroesophageal reflux disease) 4/13/2009    Hyperlipemia 4/13/2009    Hypertension 4/13/2009    MS (multiple sclerosis) ([facility] [address] 1/1/2001    Osteopenia 4/13/2009     Past Surgical History:   Procedure Laterality Date    HX HYSTERECTOMY      HX LUMBAR DISKECTOMY      HX ORTHOPAEDIC  1976    [place] REMOVAL OF OVARIAN CYST(S)       Family History   Problem Relation Age of Onset    Hypertension Mother     High Cholesterol Mother     Cancer Father         prostate    Asthma Father     High Cholesterol Sister     Cancer Brother         prostate    High Cholesterol Brother     High Cholesterol Sister     Cancer Brother      Social History     Tobacco Use    Smoking status: Never Smoker    Smokeless tobacco: Never Used   Substance Use Topics    Alcohol use: Yes     Comment: rarely - occ        Review of Systems  Pertinent items are noted in HPI. Some ongoing issues with fatigue in general.  She has had some falls without injury. She was previously seen at [facility] neurology clinic and does not wish to follow-up there. She does not feel that they were helping her.     Objective: Visit Vitals  /75   Pulse 82   Temp 98.1 °F (36.7 °C) (Oral)   Resp 12   Ht 5' 3\" (1.6 m)   Wt 146 lb (66.2 kg)   SpO2 97%   BMI 25.86 kg/m²     General:  alert, cooperative, no distress   Eyes: negative   Ears: normal TM's and external ear canals AU   Sinuses: tenderness over bilateral maxillary   Mouth:  Lips, mucosa, and tongue normal. Teeth and gums normal   Neck: supple, symmetrical, trachea midline and no adenopathy. Heart: S1 and S2 normal, no murmurs noted. Lungs: clear to auscultation bilaterally   Abdomen: soft, non-tender. Bowel sounds normal. No masses,  no organomegaly        Assessment/Plan:   sinusitis  MS-suggested she reconsider physical therapy to help with strengthening and she will consider. She may defer any further neurology evaluation as they did not feel therapy was necessary. Hypertension-controlled on losartan and she will continue that for now. Plan -   Orders Placed This Encounter    losartan (COZAAR) 50 mg tablet    cefUROXime (CEFTIN) 500 mg tablet    fluticasone (FLONASE) 50 mcg/actuation nasal spray    guaiFENesin ER (MUCINEX) 600 mg ER tablet   Advised her to call back or return to office if symptoms worsen/change/persist.  Discussed expected course/resolution/complications of diagnosis in detail with patient. Medication risks/benefits/costs/interactions/alternatives discussed with patient. She was given an after visit summary which includes diagnoses, current medications, & vitals. She expressed understanding with the diagnosis and plan.

## 2019-02-04 RX ORDER — SIMVASTATIN 40 MG/1
40 TABLET, FILM COATED ORAL
Qty: 90 TAB | Refills: 0 | Status: SHIPPED | OUTPATIENT
Start: 2019-02-04 | End: 2019-11-10 | Stop reason: SDUPTHER

## 2019-02-04 NOTE — TELEPHONE ENCOUNTER
Orders Placed This Encounter    simvastatin (ZOCOR) 40 mg tablet     Sig: Take 1 Tab by mouth nightly. LAB WORK REQUIRED FOR ADDITIONAL REFILLS     Dispense:  90 Tab     Refill:  0     The above orders were approved via VORB per Dr. Terri Manley, III.

## 2019-03-12 ENCOUNTER — PATIENT MESSAGE (OUTPATIENT)
Dept: INTERNAL MEDICINE CLINIC | Age: 74
End: 2019-03-12

## 2019-03-13 RX ORDER — METHYLPREDNISOLONE 4 MG/1
TABLET ORAL
Qty: 1 DOSE PACK | Refills: 0 | Status: SHIPPED | OUTPATIENT
Start: 2019-03-13 | End: 2019-08-21 | Stop reason: ALTCHOICE

## 2019-03-13 NOTE — TELEPHONE ENCOUNTER
From: Rosie Roque  Sent: 3/12/2019 11:05 PM EDT  To: Jasen Zhang Conway  Subject: RE: Update Medical Information    ----- Message from [address] [place] [address] 951, [facility] sent at 3/12/2019 11:05 PM EDT -----    Thanks Armando, I will try the prednisone - it has never been like this. As far as the Liquid biopsy results - it is a term [place] used - I believe it is his original prostate biopsy that was done at [facility]. I could not locate it in the paperwork I have here but he may have shredded it. I will get someone to  the script for me from [facility]. Thanks for everything. Ignacia Byrd  ----- Message -----  From: Mike Padron MD  Sent: 3/12/2019 10:05 PM EDT  To: Rosie Roque  Subject: RE: Update Medical Information  We could try some prednisone to see if it will help. I don't know what you mean by the Liquid biopsy results? His original prostate biopsy? Where was it done. [place]    ----- Message -----   From: Rosie Roque   Sent: 3/12/2019 12:22 PM EDT   To: Mike Padron MD  Subject: Update Medical Information    Hi Armando, I am in intense pain in lower back with burning down my left leg to the side of my foot to my little toe. This is my good side and it hurts so bad that I can't walk due to pain. I use my left foot to drive which is not possible to come to your office unless I find someone to bring me. I have had this before (sciatica nerve) but not this intense. I am using ice packs and stretching, which the PT had recommended, that helps for a short time. I am also taking Gabapentine (sp). What else can I do?  Ignacia Byrd  PS: [place] would like to know if we could get Virgilio's LIQUID BIOPSY RESULTS for Vinod Adams (son) who had the Braca 1 and 2 tests done by [facility] - needed so they will pay for the test. Thanks, [place]

## 2019-03-26 RX ORDER — SCOLOPAMINE TRANSDERMAL SYSTEM 1 MG/1
1 PATCH, EXTENDED RELEASE TRANSDERMAL
Qty: 3 PATCH | Refills: 0 | Status: SHIPPED | OUTPATIENT
Start: 2019-03-26 | End: 2019-08-21 | Stop reason: ALTCHOICE

## 2019-04-11 ENCOUNTER — PATIENT MESSAGE (OUTPATIENT)
Dept: INTERNAL MEDICINE CLINIC | Age: 74
End: 2019-04-11

## 2019-04-11 RX ORDER — VALACYCLOVIR HYDROCHLORIDE 1 G/1
2000 TABLET, FILM COATED ORAL 2 TIMES DAILY
Qty: 4 TAB | Refills: 0 | Status: SHIPPED | OUTPATIENT
Start: 2019-04-11 | End: 2019-04-12

## 2019-04-11 NOTE — TELEPHONE ENCOUNTER
Orders Placed This Encounter    valACYclovir (VALTREX) 1 gram tablet     Sig: Take 2 Tabs by mouth two (2) times a day for 1 day. Dispense:  4 Tab     Refill:  0     The above orders were approved via VORB per Dr. Leon Darby, III.

## 2019-04-11 NOTE — TELEPHONE ENCOUNTER
Ying Talbot MD 4/11/2019 4:20 PM EDT    Valtrex 1000 mg 2 tabs BID for one day.   ----- Message -----  From: Jean Paul Marsh LPN  Sent: 8/83/5198 2:07 PM  To: Mathew Potter MD  Subject: Emile Swift: Update Medical Information         ----- Message -----  From: Bibi Vanessa  Sent: 4/11/2019 1:57 PM  To: [facility]  Subject: RE: Update Medical Information     ----- Message from [address] [place] [address] [address], Generic sent at 4/11/2019 1:57 PM EDT -----    Thanks [facility], please send to [address] [place] [address]. [id number] and phone number [phone]. Regino Salcedo  ----- Message -----  From: Mathew Potter MD  Sent: 4/11/2019 12:33 PM EDT  To: Bibi Vanessa  Subject: RE: Update Medical Information  We can give you some pills to help. Let me know if that is OK. What pharmacy? [facility]    ----- Message -----   From: Bibi Vanessa   Sent: 4/11/2019 12:39 AM EDT   To: Mathew Potter MD  Subject: Update Medical Information    Hid Armando, I used one of my trans-term patches to fly to [place] yesterday to my son's home and awoke this morning with an upset stomach, a cold sore/fever blister on my bottom lip which is swollen, red and hurts. What can I take to help heal this before leaving for the [place] this weekend?  Thanks, Regino Salcedo :(

## 2019-05-20 DIAGNOSIS — G35 MS (MULTIPLE SCLEROSIS) (HCC): Primary | ICD-10-CM

## 2019-05-20 DIAGNOSIS — G47.10 HYPERSOMNOLENCE: ICD-10-CM

## 2019-05-20 DIAGNOSIS — J30.1 NON-SEASONAL ALLERGIC RHINITIS DUE TO POLLEN: ICD-10-CM

## 2019-05-20 RX ORDER — DEXTROAMPHETAMINE SACCHARATE, AMPHETAMINE ASPARTATE, DEXTROAMPHETAMINE SULFATE AND AMPHETAMINE SULFATE 2.5; 2.5; 2.5; 2.5 MG/1; MG/1; MG/1; MG/1
10 TABLET ORAL 2 TIMES DAILY
Qty: 180 TAB | Refills: 0 | Status: SHIPPED | OUTPATIENT
Start: 2019-05-20 | End: 2019-11-10 | Stop reason: SDUPTHER

## 2019-05-20 RX ORDER — GABAPENTIN 400 MG/1
400 CAPSULE ORAL 3 TIMES DAILY
Qty: 270 CAP | Refills: 0 | Status: SHIPPED | OUTPATIENT
Start: 2019-05-20 | End: 2019-05-21 | Stop reason: SDUPTHER

## 2019-05-20 RX ORDER — CODEINE PHOSPHATE AND GUAIFENESIN 10; 100 MG/5ML; MG/5ML
10 SOLUTION ORAL
Qty: 180 ML | Refills: 0 | Status: SHIPPED | OUTPATIENT
Start: 2019-05-20 | End: 2019-06-03

## 2019-05-21 ENCOUNTER — TELEPHONE (OUTPATIENT)
Dept: INTERNAL MEDICINE CLINIC | Age: 74
End: 2019-05-21

## 2019-05-21 RX ORDER — GABAPENTIN 400 MG/1
400 CAPSULE ORAL 3 TIMES DAILY
Qty: 270 CAP | Refills: 0 | Status: SHIPPED | OUTPATIENT
Start: 2019-05-21 | End: 2019-05-23 | Stop reason: SDUPTHER

## 2019-05-21 NOTE — TELEPHONE ENCOUNTER
Nikki Kim (Self) [phone] (H)     Pt says that she requested some refills and when she [facility] [place] and [facility]. Could this please be corrected for her.

## 2019-05-23 RX ORDER — GABAPENTIN 400 MG/1
400 CAPSULE ORAL 3 TIMES DAILY
Qty: 270 CAP | Refills: 0 | Status: SHIPPED | OUTPATIENT
Start: 2019-05-23 | End: 2019-11-10 | Stop reason: SDUPTHER

## 2019-05-23 NOTE — TELEPHONE ENCOUNTER
Orders Placed This Encounter    gabapentin (NEURONTIN) 400 mg capsule     Sig: Take 1 Cap by mouth three (3) times daily. Dispense:  270 Cap     Refill:  0     The above orders were approved via VORB per Dr. Leon Darby, III.

## 2019-07-24 RX ORDER — ESCITALOPRAM OXALATE 10 MG/1
10 TABLET ORAL DAILY
Qty: 90 TAB | Refills: 1 | Status: SHIPPED | COMMUNITY
Start: 2019-07-24 | End: 2020-01-16

## 2019-08-21 ENCOUNTER — OFFICE VISIT (OUTPATIENT)
Dept: INTERNAL MEDICINE CLINIC | Age: 74
End: 2019-08-21

## 2019-08-21 ENCOUNTER — HOSPITAL ENCOUNTER (OUTPATIENT)
Dept: GENERAL RADIOLOGY | Age: 74
Discharge: HOME | End: 2019-08-21
Attending: INTERNAL MEDICINE
Payer: MEDICARE

## 2019-08-21 VITALS
BODY MASS INDEX: 25.16 KG/M2 | RESPIRATION RATE: 12 BRPM | SYSTOLIC BLOOD PRESSURE: 133 MMHG | HEART RATE: 83 BPM | OXYGEN SATURATION: 98 % | WEIGHT: 142 LBS | HEIGHT: 63 IN | DIASTOLIC BLOOD PRESSURE: 78 MMHG | TEMPERATURE: 98.4 F

## 2019-08-21 DIAGNOSIS — M54.50 ACUTE BILATERAL LOW BACK PAIN WITHOUT SCIATICA: ICD-10-CM

## 2019-08-21 DIAGNOSIS — M54.50 ACUTE BILATERAL LOW BACK PAIN WITHOUT SCIATICA: Primary | ICD-10-CM

## 2019-08-21 PROCEDURE — 72220 X-RAY EXAM SACRUM TAILBONE: CPT

## 2019-08-21 RX ORDER — ACETAMINOPHEN 325 MG/1
650 TABLET ORAL
COMMUNITY
Start: 2019-08-21

## 2019-08-21 RX ORDER — MELOXICAM 7.5 MG/1
7.5 TABLET ORAL DAILY
Qty: 30 TAB | Refills: 0 | Status: SHIPPED | OUTPATIENT
Start: 2019-08-21 | End: 2019-09-15 | Stop reason: SDUPTHER

## 2019-08-21 NOTE — PROGRESS NOTES
HPI:  Delon Shaw is a 69y.o. year old female who is here for a routine visit:    Presents for a fall that occurred a week ago. Apparently she was standing in her hallway and turned and lost her balance and fell onto her buttocks. She did not hit her head or lose consciousness. Since then she has had pain across the lower back with no radiating pain to the legs. No numbness, tingling, or increased weakness. The pain is most notable when she is sitting on her buttocks or lying on it. She is able to walk without much difficulty aside from her usual MS issues. Past Medical History:   Diagnosis Date    Arthritis 1980    Asthma 1996    Autoimmune disease ([facility] [address] 2001    Carpal tunnel syndrome 4/13/2009    Chronic pain     GERD (gastroesophageal reflux disease) 4/13/2009    Hyperlipemia 4/13/2009    Hypertension 4/13/2009    MS (multiple sclerosis) ([facility] [address] 1/1/2001    Osteopenia 4/13/2009       Past Surgical History:   Procedure Laterality Date    HX HYSTERECTOMY      HX LUMBAR DISKECTOMY      HX ORTHOPAEDIC  1976    [place] REMOVAL OF OVARIAN CYST(S)         Prior to Admission medications    Medication Sig Start Date End Date Taking? Authorizing Provider   meloxicam (MOBIC) 7.5 mg tablet Take 1 Tab by mouth daily. 8/21/19  Yes Ricardo Barrientos MD   acetaminophen (TYLENOL) 325 mg tablet Take 2 Tabs by mouth two (2) times daily as needed for Pain. 8/21/19  Yes Ricardo Barrientos MD   escitalopram oxalate (LEXAPRO) 10 mg tablet Take 1 Tab by mouth daily. 7/24/19  Yes Ricardo Barrientos MD   gabapentin (NEURONTIN) 400 mg capsule Take 1 Cap by mouth three (3) times daily. 5/23/19  Yes Ricardo Barrientos MD   dextroamphetamine-amphetamine (ADDERALL) 10 mg tablet Take 1 Tab by mouth two (2) times a day. Max Daily Amount: 20 mg. 5/20/19  Yes Ricardo Barrientos MD   simvastatin (ZOCOR) 40 mg tablet Take 1 Tab by mouth nightly.  LAB WORK REQUIRED FOR ADDITIONAL REFILLS 2/4/19  Yes Puneet Bailon III, MD   losartan (COZAAR) 50 mg tablet Take 50 mg by mouth daily. 10/26/18  Yes Provider, Historical   fluticasone (FLONASE) 50 mcg/actuation nasal spray 2 Sprays by Both Nostrils route two (2) times daily as needed for Rhinitis. 1/28/19  Yes Isabel Dunn MD   cetirizine (ZYRTEC) 10 mg tablet Take 10 mg by mouth daily as needed. 10/29/18  Yes Isabel Dunn MD   camphor-menthol [facility]) 0.5-0.5 % lotion Apply  to affected area as needed for Itching. 1/14/16  Yes Isabel Dunn MD   cholecalciferol, vitamin D3, (VITAMIN D3) 2,000 unit tab Take 1 Tab by mouth daily.    Yes Provider, Historical       Social History     Socioeconomic History    Marital status:      Spouse name: Not on file    Number of children: Not on file    Years of education: Not on file    Highest education level: Not on file   Occupational History    Not on file   Social Needs    Financial resource strain: Not on file    Food insecurity:     Worry: Not on file     Inability: Not on file    Transportation needs:     Medical: Not on file     Non-medical: Not on file   Tobacco Use    Smoking status: Never Smoker    Smokeless tobacco: Never Used   Substance and Sexual Activity    Alcohol use: Yes     Comment: rarely - occ    Drug use: No    Sexual activity: Not Currently     Partners: Male   Lifestyle    Physical activity:     Days per week: Not on file     Minutes per session: Not on file    Stress: Not on file   Relationships    Social connections:     Talks on phone: Not on file     Gets together: Not on file     Attends [employer] service: Not on file     Active member of club or organization: Not on file     Attends meetings of clubs or organizations: Not on file     Relationship status: Not on file    Intimate partner violence:     Fear of current or ex partner: Not on file     Emotionally abused: Not on file     Physically abused: Not on file     Forced sexual activity: Not on file   Other Topics Concern    Not on file   Social History Narrative    Not on file          ROS  Per HPI. Has been using 3 Advil per day but is upsetting her stomach. Visit Vitals  /78   Pulse 83   Temp 98.4 °F (36.9 °C) (Oral)   Resp 12   Ht 5' 3\" (1.6 m)   Wt 142 lb (64.4 kg)   SpO2 98%   BMI 25.15 kg/m²         Physical Exam   Physical Examination: General appearance - alert, well appearing, and in no distress  Chest - clear to auscultation, no wheezes, rales or rhonchi, symmetric air entry  Heart - normal rate, regular rhythm, normal S1, S2, no murmurs, rubs, clicks or gallops  Back exam - tenderness noted along the lower back at the sacrum and coccyx. No bony deformity. Neurological - abnormal neurological exam unchanged from prior examinations  Extremities - peripheral pulses normal, no pedal edema, no clubbing or cyanosis      Assessment/Plan:  Diagnoses and all orders for this visit:    1. Acute bilateral low back pain without sciatica - related to a fall. Concern for possible occult fracture versus just bruising. At this point will obtain x-rays. Will treat with meloxicam and Tylenol. If not improved consider a bone scan. -     XR SACRUM AND COCCYX; Future    Other orders  -     meloxicam (MOBIC) 7.5 mg tablet; Take 1 Tab by mouth daily. Advised her to call back or return to office if symptoms worsen/change/persist.  Discussed expected course/resolution/complications of diagnosis in detail with patient. Medication risks/benefits/costs/interactions/alternatives discussed with patient. She was given an after visit summary which includes diagnoses, current medications, & vitals. She expressed understanding with the diagnosis and plan.

## 2019-08-21 NOTE — PATIENT INSTRUCTIONS

## 2019-09-13 DIAGNOSIS — N95.9 MENOPAUSAL AND POSTMENOPAUSAL DISORDER: Primary | ICD-10-CM

## 2019-09-15 RX ORDER — MELOXICAM 7.5 MG/1
TABLET ORAL
Qty: 30 TAB | Refills: 0 | Status: SHIPPED | OUTPATIENT
Start: 2019-09-15 | End: 2019-10-29 | Stop reason: ALTCHOICE

## 2019-10-10 ENCOUNTER — HOSPITAL ENCOUNTER (OUTPATIENT)
Dept: MAMMOGRAPHY | Age: 74
Discharge: HOME | End: 2019-10-10
Payer: MEDICARE

## 2019-10-10 DIAGNOSIS — N95.9 MENOPAUSAL AND POSTMENOPAUSAL DISORDER: ICD-10-CM

## 2019-10-10 PROCEDURE — 77080 DXA BONE DENSITY AXIAL: CPT

## 2019-10-29 ENCOUNTER — CLINICAL SUPPORT (OUTPATIENT)
Dept: INTERNAL MEDICINE CLINIC | Age: 74
End: 2019-10-29

## 2019-10-29 DIAGNOSIS — Z23 ENCOUNTER FOR IMMUNIZATION: ICD-10-CM

## 2019-10-29 RX ORDER — CHOLECALCIFEROL (VITAMIN D3) 25 MCG
1 TABLET,CHEWABLE ORAL DAILY
COMMUNITY

## 2019-10-29 NOTE — PROGRESS NOTES
Pharmacy Note - Immunizations    Nydia Hartmann is a 69 y.o.  female  who present for a flu shot. She denies any symptoms, reactions or allergies that would exclude them from being immunized today. Risks and adverse reactions were discussed and the VIS was given to them. All questions were addressed. Verbal order received from Dr. Claudia Olivarez    Patient was observed for 10 min post injection. There were no reactions observed.     ADAM WestD BCACP

## 2019-10-29 NOTE — PATIENT INSTRUCTIONS
Vaccine Information Statement    Influenza (Flu) Vaccine (Inactivated or Recombinant): What You Need to Know    Many Vaccine Information Statements are available in [place] and other languages. See www.immunize.org/vis  Hojas de información sobre vacunas están disponibles en español y en muchos otros idiomas. Visite www.immunize.org/vis    1. Why get vaccinated? Influenza vaccine can prevent influenza (flu). Flu is a contagious disease that spreads around the [facility] every year, usually between October and May. Anyone can get the flu, but it is more dangerous for some people. Infants and young children, people 72years of age and older, pregnant women, and people with certain health conditions or a weakened immune system are at greatest risk of flu complications. Pneumonia, bronchitis, sinus infections and ear infections are examples of flu-related complications. If you have a medical condition, such as heart disease, cancer or diabetes, flu can make it worse. Flu can cause fever and chills, sore throat, muscle aches, fatigue, cough, headache, and runny or stuffy nose. Some people may have vomiting and diarrhea, though this is more common in children than adults. Each year thousands of people in the [facility] die from flu, and many more are hospitalized. Flu vaccine prevents millions of illnesses and flu-related visits to the doctor each year. 2. Influenza vaccines     CDC recommends everyone 10months of age and older get vaccinated every flu season. Children 6 months through 6years of age may need 2 doses during a single flu season. Everyone else needs only 1 dose each flu season. It takes about 2 weeks for protection to develop after vaccination. There are many flu viruses, and they are always changing. Each year a new flu vaccine is made to protect against three or four viruses that are likely to cause disease in the upcoming flu season.  Even when the vaccine doesnt exactly match these viruses, it may still provide some protection. Influenza vaccine does not cause flu. Influenza vaccine may be given at the same time as other vaccines. 3. Talk with your health care provider    Tell your vaccine provider if the person getting the vaccine:   Has had an allergic reaction after a previous dose of influenza vaccine, or has any severe, life-threatening allergies.  Has ever had Guillain-Barré Syndrome (also called GBS). In some cases, your health care provider may decide to postpone influenza vaccination to a future visit. People with minor illnesses, such as a cold, may be vaccinated. People who are moderately or severely ill should usually wait until they recover before getting influenza vaccine. Your health care provider can give you more information. 4. Risks of a reaction     Soreness, redness, and swelling where shot is given, fever, muscle aches, and headache can happen after influenza vaccine.  There may be a very small increased risk of Guillain-Barré Syndrome (GBS) after inactivated influenza vaccine (the flu shot). Erika Marvin children who get the flu shot along with pneumococcal vaccine (PCV13), and/or DTaP vaccine at the same time might be slightly more likely to have a seizure caused by fever. Tell your health care provider if a child who is getting flu vaccine has ever had a seizure. People sometimes faint after medical procedures, including vaccination. Tell your provider if you feel dizzy or have vision changes or ringing in the ears. As with any medicine, there is a very remote chance of a vaccine causing a severe allergic reaction, other serious injury, or death. 5. What if there is a serious problem? An allergic reaction could occur after the vaccinated person leaves the clinic.  If you see signs of a severe allergic reaction (hives, swelling of the face and throat, difficulty breathing, a fast heartbeat, dizziness, or weakness), call 9-1-1 and get the person to the nearest hospital.    For other signs that concern you, call your health care provider. Adverse reactions should be reported to the Vaccine Adverse Event Reporting System (VAERS). Your health care provider will usually file this report, or you can do it yourself. Visit the VAERS website at www.vaers. [facility].gov or call 1-199.518.8629. VAERS is only for reporting reactions, and VAERS staff do not give medical advice. 6. The National Vaccine Injury Compensation Program    The [facility] Vaccine Injury Compensation Program (VICP) is a federal program that was created to compensate people who may have been injured by certain vaccines. Visit the VICP website at www.hrsa.gov/vaccinecompensation or call 4-716.912.4119 to learn about the program and about filing a claim. There is a time limit to file a claim for compensation. 7. How can I learn more?  Ask your health care provider.  Call your local or state health department.  Contact the Centers for Disease Control and Prevention (CDC):  - Call 0-627.251.2587 (1-800-CDC-INFO) or  - Visit CDCs influenza website at www.cdc.gov/flu    Vaccine Information Statement (Interim)  Inactivated Influenza Vaccine   8/15/2019  42 BENOIT Warner [id number]   Department of Health and Human Services  Centers for Disease Control and Prevention    Office Use Only

## 2019-11-10 DIAGNOSIS — G35 MS (MULTIPLE SCLEROSIS) (HCC): ICD-10-CM

## 2019-11-10 DIAGNOSIS — G47.10 HYPERSOMNOLENCE: ICD-10-CM

## 2019-11-11 RX ORDER — SIMVASTATIN 40 MG/1
40 TABLET, FILM COATED ORAL
Qty: 90 TAB | Refills: 0 | Status: SHIPPED | OUTPATIENT
Start: 2019-11-11 | End: 2020-08-16 | Stop reason: SDUPTHER

## 2019-11-11 RX ORDER — DEXTROAMPHETAMINE SACCHARATE, AMPHETAMINE ASPARTATE, DEXTROAMPHETAMINE SULFATE AND AMPHETAMINE SULFATE 2.5; 2.5; 2.5; 2.5 MG/1; MG/1; MG/1; MG/1
10 TABLET ORAL 2 TIMES DAILY
Qty: 180 TAB | Refills: 0 | Status: SHIPPED | OUTPATIENT
Start: 2019-11-11 | End: 2019-11-13 | Stop reason: SDUPTHER

## 2019-11-11 RX ORDER — GABAPENTIN 400 MG/1
400 CAPSULE ORAL 3 TIMES DAILY
Qty: 270 CAP | Refills: 0 | Status: SHIPPED | OUTPATIENT
Start: 2019-11-11 | End: 2020-04-20 | Stop reason: SDUPTHER

## 2019-11-13 DIAGNOSIS — G35 MS (MULTIPLE SCLEROSIS) (HCC): ICD-10-CM

## 2019-11-13 DIAGNOSIS — G47.10 HYPERSOMNOLENCE: ICD-10-CM

## 2019-11-13 RX ORDER — DEXTROAMPHETAMINE SACCHARATE, AMPHETAMINE ASPARTATE, DEXTROAMPHETAMINE SULFATE AND AMPHETAMINE SULFATE 2.5; 2.5; 2.5; 2.5 MG/1; MG/1; MG/1; MG/1
10 TABLET ORAL 2 TIMES DAILY
Qty: 180 TAB | Refills: 0 | Status: SHIPPED | OUTPATIENT
Start: 2019-11-13 | End: 2020-06-10 | Stop reason: SDUPTHER

## 2019-12-30 RX ORDER — LOSARTAN POTASSIUM 50 MG/1
TABLET ORAL
Qty: 90 TAB | Refills: 0 | Status: SHIPPED | COMMUNITY
Start: 2019-12-30 | End: 2020-04-07

## 2019-12-30 NOTE — TELEPHONE ENCOUNTER
Orders Placed This Encounter    losartan (COZAAR) 50 mg tablet     Sig: TAKE 1 TABLET DAILY     Dispense:  90 Tab     Refill:  0     The above orders were approved via [facility] per Dr. Kristopher Martin, III.

## 2020-01-16 RX ORDER — ESCITALOPRAM OXALATE 10 MG/1
TABLET ORAL
Qty: 90 TAB | Refills: 1 | Status: SHIPPED | OUTPATIENT
Start: 2020-01-16 | End: 2020-07-27

## 2020-01-17 ENCOUNTER — OFFICE VISIT (OUTPATIENT)
Dept: INTERNAL MEDICINE CLINIC | Age: 75
End: 2020-01-17

## 2020-01-17 ENCOUNTER — HOSPITAL ENCOUNTER (OUTPATIENT)
Dept: LAB | Age: 75
Discharge: HOME | End: 2020-01-17
Payer: MEDICARE

## 2020-01-17 VITALS
SYSTOLIC BLOOD PRESSURE: 148 MMHG | DIASTOLIC BLOOD PRESSURE: 76 MMHG | WEIGHT: 142 LBS | OXYGEN SATURATION: 96 % | RESPIRATION RATE: 14 BRPM | BODY MASS INDEX: 25.16 KG/M2 | HEIGHT: 63 IN | TEMPERATURE: 98.4 F | HEART RATE: 75 BPM

## 2020-01-17 DIAGNOSIS — R41.3 MEMORY LOSS: ICD-10-CM

## 2020-01-17 DIAGNOSIS — E55.9 VITAMIN D DEFICIENCY: ICD-10-CM

## 2020-01-17 DIAGNOSIS — G47.33 OSA (OBSTRUCTIVE SLEEP APNEA): ICD-10-CM

## 2020-01-17 DIAGNOSIS — E78.2 MIXED HYPERLIPIDEMIA: ICD-10-CM

## 2020-01-17 DIAGNOSIS — M79.2 NEUROPATHIC PAIN: ICD-10-CM

## 2020-01-17 DIAGNOSIS — Z11.59 NEED FOR HEPATITIS C SCREENING TEST: ICD-10-CM

## 2020-01-17 DIAGNOSIS — K21.9 GASTROESOPHAGEAL REFLUX DISEASE WITHOUT ESOPHAGITIS: ICD-10-CM

## 2020-01-17 DIAGNOSIS — Z00.00 MEDICARE ANNUAL WELLNESS VISIT, SUBSEQUENT: Primary | ICD-10-CM

## 2020-01-17 DIAGNOSIS — Z23 ENCOUNTER FOR IMMUNIZATION: ICD-10-CM

## 2020-01-17 DIAGNOSIS — G35 MULTIPLE SCLEROSIS (HCC): ICD-10-CM

## 2020-01-17 DIAGNOSIS — I10 ESSENTIAL HYPERTENSION: ICD-10-CM

## 2020-01-17 PROCEDURE — 36415 COLL VENOUS BLD VENIPUNCTURE: CPT

## 2020-01-17 PROCEDURE — 82306 VITAMIN D 25 HYDROXY: CPT

## 2020-01-17 PROCEDURE — 81001 URINALYSIS AUTO W/SCOPE: CPT

## 2020-01-17 PROCEDURE — 80053 COMPREHEN METABOLIC PANEL: CPT

## 2020-01-17 PROCEDURE — 86803 HEPATITIS C AB TEST: CPT

## 2020-01-17 PROCEDURE — 82607 VITAMIN B-12: CPT

## 2020-01-17 PROCEDURE — 85025 COMPLETE CBC W/AUTO DIFF WBC: CPT

## 2020-01-17 PROCEDURE — 80061 LIPID PANEL: CPT

## 2020-01-17 PROCEDURE — 84443 ASSAY THYROID STIM HORMONE: CPT

## 2020-01-17 NOTE — PATIENT INSTRUCTIONS
Preventing Falls: Care Instructions  Your Care Instructions    Getting around your home safely can be a challenge if you have injuries or health problems that make it easy for you to fall. Loose rugs and furniture in walkways are among the dangers for many older people who have problems walking or who have poor eyesight. People who have conditions such as arthritis, osteoporosis, or dementia also have to be careful not to fall. You can make your home safer with a few simple measures. Follow-up care is a key part of your treatment and safety. Be sure to make and go to all appointments, and call your doctor if you are having problems. It's also a good idea to know your test results and keep a list of the medicines you take. How can you care for yourself at home? Taking care of yourself  · You may get dizzy if you do not drink enough water. To prevent dehydration, drink plenty of fluids, enough so that your urine is light yellow or clear like water. Choose water and other caffeine-free clear liquids. If you have kidney, heart, or liver disease and have to limit fluids, talk with your doctor before you increase the amount of fluids you drink. · Exercise regularly to improve your strength, muscle tone, and balance. Walk if you can. Swimming may be a good choice if you cannot walk easily. · Have your vision and hearing checked each year or any time you notice a change. If you have trouble seeing and hearing, you might not be able to avoid objects and could lose your balance. · Know the side effects of the medicines you take. Ask your doctor or pharmacist whether the medicines you take can affect your balance. Sleeping pills or sedatives can affect your balance. · Limit the amount of alcohol you drink. Alcohol can impair your balance and other senses. · Ask your doctor whether calluses or corns on your feet need to be removed.  If you wear loose-fitting shoes because of calluses or corns, you can lose your balance and fall. · Talk to your doctor if you have numbness in your feet. Preventing falls at home  · Remove raised doorway thresholds, throw rugs, and clutter. Repair loose carpet or raised areas in the floor. · Move furniture and electrical cords to keep them out of walking paths. · Use nonskid floor wax, and wipe up spills right away, especially on ceramic tile floors. · If you use a walker or cane, put rubber tips on it. If you use crutches, clean the bottoms of them regularly with an abrasive pad, such as steel wool. · Keep your house well lit, especially Anne [place], and outside walkways. Use night-lights in areas such as hallways and bathrooms. Add extra light switches or use remote switches (such as switches that go on or off when you clap your hands) to make it easier to turn lights on if you have to get up during the night. · Install sturdy handrails on stairways. · Move items in your cabinets so that the things you use a lot are on the lower shelves (about waist level). · Keep a cordless phone and a flashlight with new batteries by your bed. If possible, put a phone in each of the main rooms of your house, or carry a cell phone in case you fall and cannot reach a phone. Or, you can wear a device around your neck or wrist. You push a button that sends a signal for help. · Wear low-heeled shoes that fit well and give your feet good support. Use footwear with nonskid soles. Check the heels and soles of your shoes for wear. Repair or replace worn heels or soles. · Do not wear socks without shoes on wood floors. · Walk on the grass when the sidewalks are slippery. If you live in an area that gets snow and ice in the winter, sprinkle salt on slippery steps and sidewalks. Preventing falls in the bath  · Install grab bars and nonskid mats inside and outside your shower or tub and near the toilet and sinks. · Use shower chairs and bath benches.   · Use a hand-held shower head that will allow you to sit while showering. · Get into a tub or shower by putting the weaker leg in first. Get out of a tub or shower with your strong side first.  · Repair loose toilet seats and consider installing a raised toilet seat to make getting on and off the toilet easier. · Keep your bathroom door unlocked while you are in the shower. Where can you learn more? Go to http://natalie-hosea.info/. Enter [phone] in the search box to learn more about \"Preventing Falls: Care Instructions. \"  Current as of: November 7, 2018  Content Version: 12.2  © 5049-1110 [employer]. Care instructions adapted under license by [employer] (which disclaims liability or warranty for this information). If you have questions about a medical condition or this instruction, always ask your healthcare professional. [place] [address] any warranty or liability for your use of this information. Medicare Wellness Visit, Female     The best way to live healthy is to have a lifestyle where you eat a well-balanced diet, exercise regularly, limit alcohol use, and quit all forms of tobacco/nicotine, if applicable. Regular preventive services are another way to keep healthy. Preventive services (vaccines, screening tests, monitoring & exams) can help personalize your care plan, which helps you manage your own care. Screening tests can find health problems at the earliest stages, when they are easiest to treat. [facility] follows the current, evidence-based guidelines published by the [facility] [place] Anirudh Nassar (USPSTF) when recommending preventive services for our patients. Because we follow these guidelines, sometimes recommendations change over time as research supports it. (For example, mammograms used to be recommended annually.  Even though Medicare will still pay for an annual mammogram, the newer guidelines recommend a mammogram every two years for women of average risk). Of course, you and your doctor may decide to screen more often for some diseases, based on your risk and your co-morbidities (chronic disease you are already diagnosed with). Preventive services for you include:  - Medicare offers their members a free annual wellness visit, which is time for you and your primary care provider to discuss and plan for your preventive service needs. Take advantage of this benefit every year!  -All adults over the age of 72 should receive the recommended pneumonia vaccines. Current USPSTF guidelines recommend a series of two vaccines for the best pneumonia protection.   -All adults should have a flu vaccine yearly and a tetanus vaccine every 10 years.   -All adults age 48 and older should receive the shingles vaccines (series of two vaccines). -All adults age 38-68 who are overweight should have a diabetes screening test once every three years.   -All adults born between [birth year] and 1965 should be screened once for Hepatitis C.  -Other screening tests and preventive services for persons with diabetes include: an eye exam to screen for diabetic retinopathy, a kidney function test, a foot exam, and stricter control over your cholesterol.   -Cardiovascular screening for adults with routine risk involves an electrocardiogram (ECG) at intervals determined by your doctor.   -Colorectal cancer screenings should be done for adults age 54-65 with no increased risk factors for colorectal cancer. There are a number of acceptable methods of screening for this type of cancer. Each test has its own benefits and drawbacks. Discuss with your doctor what is most appropriate for you during your annual wellness visit. The different tests include: colonoscopy (considered the best screening method), a fecal occult blood test, a fecal DNA test, and sigmoidoscopy.    -A bone mass density test is recommended when a woman turns 65 to screen for osteoporosis.  This test is only recommended one time, as a screening. Some providers will use this same test as a disease monitoring tool if you already have osteoporosis. -Breast cancer screenings are recommended every other year for women of normal risk, age 54-69.  -Cervical cancer screenings for women over age 72 are only recommended with certain risk factors.      Here is a list of your current Health Maintenance items (your personalized list of preventive services) with a due date:  Health Maintenance Due   Topic Date Due    Hepatitis C Test  1945    Shingles Vaccine (1 of 2) 06/16/1996    Pneumococcal Vaccine (1 of 1 - PPSV23) 06/16/2011    Glaucoma Screening   08/22/2018

## 2020-01-17 NOTE — PROGRESS NOTES
This is the Subsequent Medicare Annual Wellness Exam, performed 12 months or more after the Initial AWV or the last Subsequent AWV    I have reviewed the patient's medical history in detail and updated the computerized patient record. Also for a follow-up of her health issues. She is living alone alone now and doing reasonably well. She has had no recent falls. She does feel unsteady on her feet at times. She is lightheaded intermittently. Denies any bad headaches. No dizziness like vertigo. No chest pains or shortness of breath. No cough or wheeze. No change in bowel or bladder habits. She does have some urinary frequency. She has a skin lesion on her right breast she wanted me to check. She does have fatigue at times. She has difficult time with sleeping intermittently.     History     Patient Active Problem List   Diagnosis Code    GERD (gastroesophageal reflux disease) K21.9    Hyperlipemia E78.5    Osteopenia M85.80    Carpal tunnel syndrome G56.00    Hypertension I10    Chest pain R07.9    Advanced care planning/counseling discussion Z71.89    Neuropathic pain M79.2    Costochondritis M94.0    Fatigue due to sleep pattern disturbance R53.83, G47.9    SHANNAN (obstructive sleep apnea) G47.33    Multiple sclerosis (HCC) G35     Past Medical History:   Diagnosis Date    Arthritis 1980    Asthma 1996    Autoimmune disease ([facility] [address] 2001    Carpal tunnel syndrome 4/13/2009    Chronic pain     GERD (gastroesophageal reflux disease) 4/13/2009    Hyperlipemia 4/13/2009    Hypertension 4/13/2009    MS (multiple sclerosis) ([facility] [address] 1/1/2001    Osteopenia 4/13/2009    Vertigo       Past Surgical History:   Procedure Laterality Date    HX HYSTERECTOMY      HX LUMBAR DISKECTOMY      HX ORTHOPAEDIC  1976    [place] REMOVAL OF OVARIAN CYST(S)       Current Outpatient Medications   Medication Sig Dispense Refill    varicella-zoster recombinant, PF, (SHINGRIX, PF,) 50 mcg/0.5 mL susr injection 0.5 mL by IntraMUSCular route once for 1 dose. 0.5 mL 1    escitalopram oxalate (LEXAPRO) 10 mg tablet TAKE 1 TABLET DAILY 90 Tab 1    losartan (COZAAR) 50 mg tablet TAKE 1 TABLET DAILY 90 Tab 0    dextroamphetamine-amphetamine (ADDERALL) 10 mg tablet Take 1 Tab by mouth two (2) times a day. Max Daily Amount: 20 mg. PT REQUEST THE \"BLUE PILL\" 180 Tab 0    simvastatin (ZOCOR) 40 mg tablet Take 1 Tab by mouth nightly. LAB WORK REQUIRED FOR ADDITIONAL REFILLS 90 Tab 0    gabapentin (NEURONTIN) 400 mg capsule Take 1 Cap by mouth three (3) times daily. 270 Cap 0    cyanocobalamin, vitamin B-12, 1,000 mcg lozg 1 Tab by SubLINGual route daily.  acetaminophen (TYLENOL) 325 mg tablet Take 2 Tabs by mouth two (2) times daily as needed for Pain.  fluticasone (FLONASE) 50 mcg/actuation nasal spray 2 Sprays by Both Nostrils route two (2) times daily as needed for Rhinitis. 1 Bottle     cetirizine (ZYRTEC) 10 mg tablet Take 10 mg by mouth daily as needed.  camphor-menthol (SARNA) 0.5-0.5 % lotion Apply  to affected area as needed for Itching. 222 mL 0    cholecalciferol, vitamin D3, (VITAMIN D3) 2,000 unit tab Take 1 Tab by mouth daily.        Allergies   Allergen Reactions    Augmentin [Amoxicillin-Pot Clavulanate] Other (comments)     \"sick\"    Sulfa (Sulfonamide Antibiotics) Hives and Rash    Zithromax [Azithromycin] Unknown (comments)     \"didn't work\"       Family History   Problem Relation Age of Onset    Hypertension Mother     High Cholesterol Mother     Cancer Father         prostate    Asthma Father     High Cholesterol Sister     Cancer Brother         prostate    High Cholesterol Brother     High Cholesterol Sister     Dementia Sister      Social History     Tobacco Use    Smoking status: Never Smoker    Smokeless tobacco: Never Used   Substance Use Topics    Alcohol use: Never     Comment: rarely - occ   ROS - Per HPI  Physical Examination: General appearance - alert, well appearing, and in no distress  Eyes - pupils equal and reactive, extraocular eye movements intact  Ears - bilateral TM's and external ear canals normal  Nose - normal and patent, no erythema, discharge or polyps  Mouth - mucous membranes moist, pharynx normal without lesions  Neck - supple, no significant adenopathy  Lymphatics - no palpable lymphadenopathy, no hepatosplenomegaly  Chest - clear to auscultation, no wheezes, rales or rhonchi, symmetric air entry  Heart - normal rate and regular rhythm  Abdomen - soft, nontender, nondistended, no masses or organomegaly  Neurological - alert, oriented, normal speech, abnormal exam unchanged from before. Does have a brace in place on her right leg. Musculoskeletal - abnormal exam of both shoulders with crepitus and anterior joint line tenderness. No effusions. Extremities - peripheral pulses normal, no pedal edema, no clubbing or cyanosis  Skin -small sebaceous cyst on the medial aspect of the right breast.  No erythema or drainage. Depression Risk Factor Screening:     3 most recent PHQ Screens 8/21/2019   Little interest or pleasure in doing things Not at all   Feeling down, depressed, irritable, or hopeless Not at all   Total Score PHQ 2 0       Alcohol Risk Factor Screening:   Do you average 1 drink per night or more than 7 drinks a week:  No    On any one occasion in the past three months have you have had more than 3 drinks containing alcohol:  No      Functional Ability and Level of Safety:   Hearing: Hearing is good. Activities of Daily Living: The home contains: no safety equipment. Patient does total self care    Ambulation: with difficulty, uses a cane    Fall Risk:  Fall Risk Assessment, last 12 mths 1/17/2020   Able to walk? Yes   Fall in past 12 months? Yes   Fall with injury?  Yes   Number of falls in past 12 months 3   Fall Risk Score 4       Abuse Screen:  Patient is not abused    Cognitive Screening   Has your family/caregiver stated any concerns about your memory: no      Patient Care Team   Patient Care Team:  Dolores Roach MD as PCP - General  Dolores Roach MD as PCP - [facility]  Fara Pantoja MD as Physician (Gastroenterology)  Britney Carbajal as Physician (Neurology)  Debra Jackson as Physician (Sleep Medicine)  Toni Guevara OD (Optometry)    Assessment/Plan   Education and counseling provided:  Are appropriate based on today's review and evaluation  End-of-Life planning (with patient's consent)  Pneumococcal Vaccine -patient refused today. She will consider. Screening Mammography-patient refused. She does not want to have another one done. Diabetes screening test    Diagnoses and all orders for this visit:    1. Essential hypertension -blood pressure well controlled. Will continue current medications for that. 2. Gastroesophageal reflux disease without esophagitis -using medications as needed and would continue. 3. Multiple sclerosis (HCC)-followed at [facility]. Will check electrolytes and B12 levels today. -     VITAMIN B12  -     UA/M W/RFLX CULTURE, COMP    4. SHANNAN (obstructive sleep apnea)    5. Mixed hyperlipidemia -statin controlled. Check levels to be sure that is adequate. -     CBC WITH AUTOMATED DIFF  -     METABOLIC PANEL, COMPREHENSIVE  -     LIPID PANEL  -     TSH RFX ON ABNORMAL TO FREE T4    6. Neuropathic pain -continue current medications. 7. Need for hepatitis C screening test  -     HEPATITIS C AB    8. Vitamin D deficiency -repeat levels to be sure they are adequate. -     VITAMIN D, 25 HYDROXY    9. Encounter for immunization    10. Memory loss -given her fatigue and some memory loss and attention issues, will check B12 level. -     VITAMIN B12    11. Medicare annual wellness visit, subsequent    Other orders  -     varicella-zoster recombinant, PF, (SHINGRIX, PF,) 50 mcg/0.5 mL susr injection; 0.5 mL by IntraMUSCular route once for 1 dose.         Health Maintenance Due   Topic Date Due  Hepatitis C Screening  1945    Shingrix Vaccine Age 50> (1 of 2) 06/16/1996    Pneumococcal 65+ years (1 of 1 - PPSV23) 06/16/2011    GLAUCOMA SCREENING Q2Y  08/22/2018

## 2020-01-18 LAB
ALBUMIN SERPL-MCNC: 4.8 G/DL (ref 3.5–4.8)
ALBUMIN/GLOB SERPL: 2.2 {RATIO} (ref 1.2–2.2)
ALP SERPL-CCNC: 89 IU/L (ref 39–117)
ALT SERPL-CCNC: 40 IU/L (ref 0–32)
AST SERPL-CCNC: 31 IU/L (ref 0–40)
BASOPHILS # BLD AUTO: 0 X10E3/UL (ref 0–0.2)
BASOPHILS NFR BLD AUTO: 1 %
BILIRUB SERPL-MCNC: 0.6 MG/DL (ref 0–1.2)
BUN SERPL-MCNC: 17 MG/DL (ref 8–27)
BUN/CREAT SERPL: 20 (ref 12–28)
CALCIUM SERPL-MCNC: 10.1 MG/DL (ref 8.7–10.3)
CHLORIDE SERPL-SCNC: 102 MMOL/L (ref 96–106)
CHOLEST SERPL-MCNC: 253 MG/DL (ref 100–199)
CO2 SERPL-SCNC: 22 MMOL/L (ref 20–29)
CREAT SERPL-MCNC: 0.83 MG/DL (ref 0.57–1)
EOSINOPHIL # BLD AUTO: 0.2 X10E3/UL (ref 0–0.4)
EOSINOPHIL NFR BLD AUTO: 3 %
ERYTHROCYTE [DISTWIDTH] IN BLOOD BY AUTOMATED COUNT: 12.7 % (ref 11.7–15.4)
GLOBULIN SER CALC-MCNC: 2.2 G/DL (ref 1.5–4.5)
GLUCOSE SERPL-MCNC: 90 MG/DL (ref 65–99)
HCT VFR BLD AUTO: 37 % (ref 34–46.6)
HDLC SERPL-MCNC: 69 MG/DL
HGB BLD-MCNC: 12.7 G/DL (ref 11.1–15.9)
IMM GRANULOCYTES # BLD AUTO: 0 X10E3/UL (ref 0–0.1)
IMM GRANULOCYTES NFR BLD AUTO: 0 %
LDLC SERPL CALC-MCNC: 162 MG/DL (ref 0–99)
LYMPHOCYTES # BLD AUTO: 2.2 X10E3/UL (ref 0.7–3.1)
LYMPHOCYTES NFR BLD AUTO: 26 %
MCH RBC QN AUTO: 29.9 PG (ref 26.6–33)
MCHC RBC AUTO-ENTMCNC: 34.3 G/DL (ref 31.5–35.7)
MCV RBC AUTO: 87 FL (ref 79–97)
MONOCYTES # BLD AUTO: 0.8 X10E3/UL (ref 0.1–0.9)
MONOCYTES NFR BLD AUTO: 9 %
NEUTROPHILS # BLD AUTO: 5.2 X10E3/UL (ref 1.4–7)
NEUTROPHILS NFR BLD AUTO: 61 %
PLATELET # BLD AUTO: 260 X10E3/UL (ref 150–450)
POTASSIUM SERPL-SCNC: 4.6 MMOL/L (ref 3.5–5.2)
PROT SERPL-MCNC: 7 G/DL (ref 6–8.5)
RBC # BLD AUTO: 4.25 X10E6/UL (ref 3.77–5.28)
SODIUM SERPL-SCNC: 144 MMOL/L (ref 134–144)
TRIGL SERPL-MCNC: 110 MG/DL (ref 0–149)
TSH SERPL DL<=0.005 MIU/L-ACNC: 1.62 UIU/ML (ref 0.45–4.5)
VLDLC SERPL CALC-MCNC: 22 MG/DL (ref 5–40)
WBC # BLD AUTO: 8.5 X10E3/UL (ref 3.4–10.8)

## 2020-01-23 LAB
25(OH)D3+25(OH)D2 SERPL-MCNC: 40.9 NG/ML (ref 30–100)
APPEARANCE UR: CLEAR
BACTERIA #/AREA URNS HPF: NORMAL /[HPF]
BACTERIA UR CULT: NORMAL
BILIRUB UR QL STRIP: NEGATIVE
CASTS URNS QL MICRO: NORMAL /LPF
COLOR UR: YELLOW
EPI CELLS #/AREA URNS HPF: NORMAL /HPF (ref 0–10)
GLUCOSE UR QL: NEGATIVE
HCV AB S/CO SERPL IA: <0.1 S/CO RATIO (ref 0–0.9)
HGB UR QL STRIP: NEGATIVE
KETONES UR QL STRIP: NEGATIVE
LEUKOCYTE ESTERASE UR QL STRIP: ABNORMAL
MICRO URNS: ABNORMAL
MUCOUS THREADS URNS QL MICRO: PRESENT
NITRITE UR QL STRIP: NEGATIVE
PH UR STRIP: 5.5 [PH] (ref 5–7.5)
PROT UR QL STRIP: NEGATIVE
RBC #/AREA URNS HPF: NORMAL /HPF (ref 0–2)
SP GR UR: 1.02 (ref 1–1.03)
URINALYSIS REFLEX , 377201: ABNORMAL
UROBILINOGEN UR STRIP-MCNC: 0.2 MG/DL (ref 0.2–1)
VIT B12 SERPL-MCNC: 807 PG/ML (ref 232–1245)
WBC #/AREA URNS HPF: NORMAL /HPF (ref 0–5)

## 2020-03-08 ENCOUNTER — PATIENT MESSAGE (OUTPATIENT)
Dept: INTERNAL MEDICINE CLINIC | Age: 75
End: 2020-03-08

## 2020-03-08 DIAGNOSIS — R05.9 COUGH: Primary | ICD-10-CM

## 2020-03-09 RX ORDER — CODEINE PHOSPHATE AND GUAIFENESIN 10; 100 MG/5ML; MG/5ML
5 SOLUTION ORAL
Qty: 236 ML | Refills: 0 | Status: SHIPPED | OUTPATIENT
Start: 2020-03-09 | End: 2020-03-16

## 2020-03-18 RX ORDER — CEFUROXIME AXETIL 500 MG/1
500 TABLET ORAL 2 TIMES DAILY
Qty: 20 TAB | Refills: 0 | Status: SHIPPED | OUTPATIENT
Start: 2020-03-18 | End: 2020-08-06 | Stop reason: ALTCHOICE

## 2020-04-07 RX ORDER — LOSARTAN POTASSIUM 50 MG/1
TABLET ORAL
Qty: 90 TAB | Refills: 0 | Status: SHIPPED | OUTPATIENT
Start: 2020-04-07 | End: 2020-07-20 | Stop reason: SDUPTHER

## 2020-04-20 DIAGNOSIS — G35 MS (MULTIPLE SCLEROSIS) (HCC): ICD-10-CM

## 2020-04-21 RX ORDER — GABAPENTIN 400 MG/1
400 CAPSULE ORAL 3 TIMES DAILY
Qty: 270 CAP | Refills: 0 | Status: SHIPPED | COMMUNITY
Start: 2020-04-21 | End: 2020-08-16

## 2020-06-10 ENCOUNTER — PATIENT MESSAGE (OUTPATIENT)
Dept: INTERNAL MEDICINE CLINIC | Age: 75
End: 2020-06-10

## 2020-06-10 DIAGNOSIS — G35 MS (MULTIPLE SCLEROSIS) (HCC): ICD-10-CM

## 2020-06-10 DIAGNOSIS — G47.10 HYPERSOMNOLENCE: ICD-10-CM

## 2020-06-10 RX ORDER — DEXTROAMPHETAMINE SACCHARATE, AMPHETAMINE ASPARTATE, DEXTROAMPHETAMINE SULFATE AND AMPHETAMINE SULFATE 2.5; 2.5; 2.5; 2.5 MG/1; MG/1; MG/1; MG/1
10 TABLET ORAL 2 TIMES DAILY
Qty: 180 TAB | Refills: 0 | Status: SHIPPED | OUTPATIENT
Start: 2020-06-10 | End: 2020-12-09 | Stop reason: SDUPTHER

## 2020-06-30 ENCOUNTER — HOSPITAL ENCOUNTER (EMERGENCY)
Age: 75
Discharge: HOME | End: 2020-07-01
Attending: EMERGENCY MEDICINE | Admitting: EMERGENCY MEDICINE
Payer: MEDICARE

## 2020-06-30 ENCOUNTER — APPOINTMENT (OUTPATIENT)
Dept: GENERAL RADIOLOGY | Age: 75
End: 2020-06-30
Attending: EMERGENCY MEDICINE
Payer: MEDICARE

## 2020-06-30 ENCOUNTER — APPOINTMENT (OUTPATIENT)
Dept: CT IMAGING | Age: 75
End: 2020-06-30
Attending: EMERGENCY MEDICINE
Payer: MEDICARE

## 2020-06-30 DIAGNOSIS — S62.102A CLOSED FRACTURE OF LEFT WRIST, INITIAL ENCOUNTER: Primary | ICD-10-CM

## 2020-06-30 PROCEDURE — 99284 EMERGENCY DEPT VISIT MOD MDM: CPT

## 2020-06-30 PROCEDURE — 73100 X-RAY EXAM OF WRIST: CPT

## 2020-06-30 PROCEDURE — 70450 CT HEAD/BRAIN W/O DYE: CPT

## 2020-06-30 PROCEDURE — 74011250636 HC RX REV CODE- 250/636: Performed by: EMERGENCY MEDICINE

## 2020-06-30 PROCEDURE — 74011250637 HC RX REV CODE- 250/637: Performed by: PHYSICIAN ASSISTANT

## 2020-06-30 PROCEDURE — 72125 CT NECK SPINE W/O DYE: CPT

## 2020-06-30 PROCEDURE — 75810000303 HC CLSD TRMT  FRACTURE/DISLOCATION W/  ANES

## 2020-06-30 PROCEDURE — 96374 THER/PROPH/DIAG INJ IV PUSH: CPT

## 2020-06-30 PROCEDURE — 74011000250 HC RX REV CODE- 250: Performed by: EMERGENCY MEDICINE

## 2020-06-30 RX ORDER — DIAZEPAM 5 MG/1
2.5 TABLET ORAL ONCE
Status: COMPLETED | OUTPATIENT
Start: 2020-06-30 | End: 2020-06-30

## 2020-06-30 RX ORDER — LIDOCAINE HYDROCHLORIDE 20 MG/ML
10 INJECTION, SOLUTION INFILTRATION; PERINEURAL ONCE
Status: COMPLETED | OUTPATIENT
Start: 2020-06-30 | End: 2020-06-30

## 2020-06-30 RX ORDER — HYDROMORPHONE HYDROCHLORIDE 1 MG/ML
0.5 INJECTION, SOLUTION INTRAMUSCULAR; INTRAVENOUS; SUBCUTANEOUS ONCE
Status: COMPLETED | OUTPATIENT
Start: 2020-06-30 | End: 2020-06-30

## 2020-06-30 RX ADMIN — DIAZEPAM 2.5 MG: 5 TABLET ORAL at 23:49

## 2020-06-30 RX ADMIN — HYDROMORPHONE HYDROCHLORIDE 0.5 MG: 1 INJECTION, SOLUTION INTRAMUSCULAR; INTRAVENOUS; SUBCUTANEOUS at 23:06

## 2020-06-30 RX ADMIN — LIDOCAINE HYDROCHLORIDE 200 MG: 20 INJECTION, SOLUTION INFILTRATION; PERINEURAL at 23:50

## 2020-07-01 ENCOUNTER — APPOINTMENT (OUTPATIENT)
Dept: GENERAL RADIOLOGY | Age: 75
End: 2020-07-01
Attending: EMERGENCY MEDICINE
Payer: MEDICARE

## 2020-07-01 ENCOUNTER — TELEPHONE (OUTPATIENT)
Dept: INTERNAL MEDICINE CLINIC | Age: 75
End: 2020-07-01

## 2020-07-01 VITALS
RESPIRATION RATE: 18 BRPM | OXYGEN SATURATION: 98 % | HEART RATE: 83 BPM | TEMPERATURE: 98.5 F | SYSTOLIC BLOOD PRESSURE: 124 MMHG | DIASTOLIC BLOOD PRESSURE: 68 MMHG

## 2020-07-01 PROCEDURE — 73030 X-RAY EXAM OF SHOULDER: CPT

## 2020-07-01 PROCEDURE — 75810000303 HC CLSD TRMT  FRACTURE/DISLOCATION W/  ANES

## 2020-07-01 PROCEDURE — 73100 X-RAY EXAM OF WRIST: CPT

## 2020-07-01 RX ORDER — TRAMADOL HYDROCHLORIDE 50 MG/1
50 TABLET ORAL
Qty: 15 TAB | Refills: 0 | Status: SHIPPED | OUTPATIENT
Start: 2020-07-01 | End: 2020-07-09

## 2020-07-01 NOTE — CONSULTS
ORTHOPAEDIC CONSULT NOTE    Subjective:     Date of Consultation:  July 1, 2020  Referring Physician:  Hussain Ponce is a 77 y.o. female with PMH significant for MS, HTN arthritis and s/p right wrist fracture requiring operative fixation by Dr Patti Coughlin in the past is seen for left wrist pain and deformity following a GLF at home tonight. States she was in the shower when she slipped and fell landing on her outstretched hand and had immediate pain. She was unable to get herself up without EMS assistance due to MS related mobility issues. She was brought here and found to have a left distal radius fracture. She complains of left wrist pain and some right shoulder pain. She denies elbow or neck pain. She denies tingling or numbness. She denies open wounds or head injury. She lives alone at home an uses a cane when walking. Her daughter lives next door.     Patient Active Problem List    Diagnosis Date Noted    Fatigue due to sleep pattern disturbance 10/29/2018    SHANNAN (obstructive sleep apnea) 10/29/2018    Multiple sclerosis ([facility] [address] 11/16/2016    Advanced care planning/counseling discussion 07/29/2016    Neuropathic pain 07/29/2016    Costochondritis 07/29/2016    Chest pain 07/20/2016    GERD (gastroesophageal reflux disease) 04/13/2009    Hyperlipemia 04/13/2009    Osteopenia 04/13/2009    Carpal tunnel syndrome 04/13/2009    Hypertension 04/13/2009     Family History   Problem Relation Age of Onset    Hypertension Mother     High Cholesterol Mother     Cancer Father         prostate    Asthma Father     High Cholesterol Sister     Cancer Brother         prostate    High Cholesterol Brother     High Cholesterol Sister     Dementia Sister       Social History     Tobacco Use    Smoking status: Never Smoker    Smokeless tobacco: Never Used   Substance Use Topics    Alcohol use: Never     Comment: rarely - occ     Past Medical History:   Diagnosis Date    Arthritis 1980    Asthma 1996    Autoimmune disease ([facility] 2001    Carpal tunnel syndrome 4/13/2009    Chronic pain     GERD (gastroesophageal reflux disease) 4/13/2009    Hyperlipemia 4/13/2009    Hypertension 4/13/2009    MS (multiple sclerosis) ([facility] 1/1/2001    Osteopenia 4/13/2009    Vertigo       Past Surgical History:   Procedure Laterality Date    HX HYSTERECTOMY      HX LUMBAR DISKECTOMY      HX ORTHOPAEDIC  1976    RI REMOVAL OF OVARIAN CYST(S)        Prior to Admission medications    Medication Sig Start Date End Date Taking? Authorizing Provider   dextroamphetamine-amphetamine (ADDERALL) 10 mg tablet Take 1 Tab by mouth two (2) times a day. Max Daily Amount: 20 mg. PT REQUEST THE \"BLUE PILL\" 6/10/20   Jake Craft III, MD   gabapentin (NEURONTIN) 400 mg capsule Take 1 Cap by mouth three (3) times daily. 4/21/20   Rigoberto Titus MD   losartan (COZAAR) 50 mg tablet TAKE 1 TABLET DAILY 4/7/20   Jake Craft III, MD   cefUROXime (CEFTIN) 500 mg tablet Take 1 Tab by mouth two (2) times a day. 3/18/20   Rigoberto Titus MD   escitalopram oxalate (LEXAPRO) 10 mg tablet TAKE 1 TABLET DAILY 1/16/20   Jake Craft III, MD   simvastatin (ZOCOR) 40 mg tablet Take 1 Tab by mouth nightly. LAB WORK REQUIRED FOR ADDITIONAL REFILLS 11/11/19   Jake Craft III, MD   cyanocobalamin, vitamin B-12, 1,000 mcg lozg 1 Tab by SubLINGual route daily. Provider, Historical   acetaminophen (TYLENOL) 325 mg tablet Take 2 Tabs by mouth two (2) times daily as needed for Pain. 8/21/19   Rigoberto Titus MD   fluticasone (FLONASE) 50 mcg/actuation nasal spray 2 Sprays by Both Nostrils route two (2) times daily as needed for Rhinitis. 1/28/19   Jake Craft III, MD   cetirizine (ZYRTEC) 10 mg tablet Take 10 mg by mouth daily as needed. 10/29/18   Rigoberto Titus MD   camphor-menthol [employer] [facility]) 0.5-0.5 % lotion Apply  to affected area as needed for Itching.  1/14/16   Rigoberto Titus MD   cholecalciferol, vitamin D3, (VITAMIN D3) 2,000 unit tab Take 1 Tab by mouth daily. Provider, Historical     No current facility-administered medications for this encounter. Current Outpatient Medications   Medication Sig    dextroamphetamine-amphetamine (ADDERALL) 10 mg tablet Take 1 Tab by mouth two (2) times a day. Max Daily Amount: 20 mg. PT REQUEST THE \"BLUE PILL\"    gabapentin (NEURONTIN) 400 mg capsule Take 1 Cap by mouth three (3) times daily.  losartan (COZAAR) 50 mg tablet TAKE 1 TABLET DAILY    cefUROXime (CEFTIN) 500 mg tablet Take 1 Tab by mouth two (2) times a day.  escitalopram oxalate (LEXAPRO) 10 mg tablet TAKE 1 TABLET DAILY    simvastatin (ZOCOR) 40 mg tablet Take 1 Tab by mouth nightly. LAB WORK REQUIRED FOR ADDITIONAL REFILLS    cyanocobalamin, vitamin B-12, 1,000 mcg lozg 1 Tab by SubLINGual route daily.  acetaminophen (TYLENOL) 325 mg tablet Take 2 Tabs by mouth two (2) times daily as needed for Pain.  fluticasone (FLONASE) 50 mcg/actuation nasal spray 2 Sprays by Both Nostrils route two (2) times daily as needed for Rhinitis.  cetirizine (ZYRTEC) 10 mg tablet Take 10 mg by mouth daily as needed.  camphor-menthol (SARNA) 0.5-0.5 % lotion Apply  to affected area as needed for Itching.  cholecalciferol, vitamin D3, (VITAMIN D3) 2,000 unit tab Take 1 Tab by mouth daily. Allergies   Allergen Reactions    Augmentin [Amoxicillin-Pot Clavulanate] Other (comments)     \"sick\"    Sulfa (Sulfonamide Antibiotics) Hives and Rash    Zithromax [Azithromycin] Unknown (comments)     \"didn't work\"        Review of Systems:  Pertinent items are noted in HPI.     Mental Status: no dementia    Objective:     Patient Vitals for the past 8 hrs:   BP Temp Pulse Resp SpO2   20 2110 128/68 98.5 °F (36.9 °C) 86 18 98 %     Temp (24hrs), Av.5 °F (36.9 °C), Min:98.5 °F (36.9 °C), Max:98.5 °F (36.9 °C)      EXAM: Awake and alert lying on stretcher; NAD; agreeable to exam  Left wrist with visible dorsal/radial deformity  Palpable fracture stepoff with TTP at fracture site  Left elbow and shoulder NTTP  Moves left fingers to command with intact distal sensory function  Right shoulder mildly TTP generally about the joint region but no specific point tenderness  Mild pain with passive range but able to actively flex and abduct arm past 90deg  5/5 strength for arm abduction, forearm flexion and extension and hand intrinsic function    Imaging Review: EXAM: XR WRIST LT AP/LAT     INDICATION: poss broken wrist post glf. Left wrist pain     COMPARISON: None.     FINDINGS: Two  views of the left wrist demonstrate comminuted intra-articular  fracture of the distal radius. There is one half shaft width dorsal displacement  of the distal fracture fragment with impaction. This results in dorsal  angulation of the radiocarpal joint. Nondisplaced fracture at the base of the  ulnar styloid. . There is soft tissue swelling.     IMPRESSION  IMPRESSION:      Acute fractures of the distal radius and ulna. Labs: No results found for this or any previous visit (from the past 24 hour(s)). Impression:     Active Problems:    * No active hospital problems. *      Plan:     Acute fracture of left distal radius - will require closed reduction;  Hematoma block applied; Sugar tong splint; Sling for support and elevation will need outpatient follow-up for operative management consideration    Right shoulder pain - imaging shows degenerative changes only; no indication for intervention at this time; outpatient follow-up; use as tolerates    Follow-up outpatient later this week with Dr Raji Wild    Patient presentation and disposition to be discussed with Dr Genny Banegas PA-C  Orthopedic Trauma Service  [facility]

## 2020-07-01 NOTE — ED TRIAGE NOTES
Pt presents from ems post glf. Pt has MS and it makes her right side weak at baseline. Pt reports tripping and falling, trying to brace with left wrist and hand. Pt reports pain in that extremity, obvious deformity. Pt able to move fingers. Pt pain 10/10, given 30mg toradol by ems pain now 6/10.  Pt denies hitting head

## 2020-07-01 NOTE — ED PROVIDER NOTES
Please note that this dictation was completed with [employer], the computer voice recognition software.  Quite often unanticipated grammatical, syntax, homophones, and other interpretive errors are inadvertently transcribed by the computer software.  Please disregard these errors.  Please excuse any errors that have escaped final proofreading. 58-year-old female past medical history markable for arthritis, asthma, carpal tunnel syndrome, chronic pain, GERD, hyperlipidemia, hypertension, multiple sclerosis, osteopenia, and vertigo presents the ER after slipping and falling in the shower complaining of left wrist right shoulder pain. She is right-hand dominant. Denies loss consciousness headaches vision changes other current systemic complaints.     pt denies HA, vison changes, diff swallowing, CP, SOB, Abd pain, F/Ch, N/V, D/Cons or other current systemic complaints    Social/ PSH reviewed in EMR    EMR Chart Reviewed             Past Medical History:   Diagnosis Date    Arthritis 1980    Asthma 1996    Autoimmune disease ([facility] [address] 2001    Carpal tunnel syndrome 4/13/2009    Chronic pain     GERD (gastroesophageal reflux disease) 4/13/2009    Hyperlipemia 4/13/2009    Hypertension 4/13/2009    MS (multiple sclerosis) ([facility] [address] 1/1/2001    Osteopenia 4/13/2009    Vertigo        Past Surgical History:   Procedure Laterality Date    HX HYSTERECTOMY      HX LUMBAR DISKECTOMY      HX ORTHOPAEDIC  1976    [place] REMOVAL OF OVARIAN CYST(S)           Family History:   Problem Relation Age of Onset    Hypertension Mother     High Cholesterol Mother     Cancer Father         prostate    Asthma Father     High Cholesterol Sister     Cancer Brother         prostate    High Cholesterol Brother     High Cholesterol Sister     Dementia Sister        Social History     Socioeconomic History    Marital status:      Spouse name: Not on file    Number of children: Not on file    Years of education: Not on file   Ju [place] Highest education level: Not on file   Occupational History    Not on file   Social Needs    Financial resource strain: Not on file    Food insecurity     Worry: Not on file     Inability: Not on file    Transportation needs     Medical: Not on file     Non-medical: Not on file   Tobacco Use    Smoking status: Never Smoker    Smokeless tobacco: Never Used   Substance and Sexual Activity    Alcohol use: Never     Comment: rarely - occ    Drug use: No    Sexual activity: Not Currently     Partners: Male   Lifestyle    Physical activity     Days per week: Not on file     Minutes per session: Not on file    Stress: Not on file   Relationships    Social connections     Talks on phone: Not on file     Gets together: Not on file     Attends Zoroastrian service: Not on file     Active member of club or organization: Not on file     Attends meetings of clubs or organizations: Not on file     Relationship status: Not on file    Intimate partner violence     Fear of current or ex partner: Not on file     Emotionally abused: Not on file     Physically abused: Not on file     Forced sexual activity: Not on file   Other Topics Concern    Not on file   Social History Narrative    Not on file         ALLERGIES: Augmentin [amoxicillin-pot clavulanate]; Sulfa (sulfonamide antibiotics); and Zithromax [azithromycin]    Review of Systems   Constitutional: Negative for appetite change, chills and fever. HENT: Negative for drooling, trouble swallowing and voice change. Eyes: Negative for visual disturbance. Respiratory: Negative for chest tightness. Cardiovascular: Negative for chest pain, palpitations and leg swelling. Gastrointestinal: Negative for abdominal pain, diarrhea, nausea and vomiting. Genitourinary: Negative for dysuria. Musculoskeletal: Positive for arthralgias and myalgias. Skin: Positive for color change. Neurological: Negative for facial asymmetry, speech difficulty and headaches.    All other systems reviewed and are negative. Vitals:    06/30/20 2110   BP: 128/68   Pulse: 86   Resp: 18   Temp: 98.5 °F (36.9 °C)   SpO2: 98%            Physical Exam  Vitals signs and nursing note reviewed. Constitutional:       General: She is not in acute distress. Appearance: Normal appearance. She is well-developed. She is not ill-appearing, toxic-appearing or diaphoretic. Comments: Uncomfortable appearing, AxOx4, speaking in complete sentences    R hand dominant    Holding flexed L arm;    HENT:      Head: Normocephalic and atraumatic. Comments: Cn intact    No obvious deformities, no septal hematoma or bilat hemotympanum; Bite wnl, no blood in mouth, no crepitus in neck. Right Ear: External ear normal.      Left Ear: External ear normal.   Eyes:      General: No scleral icterus. Right eye: No discharge. Conjunctiva/sclera: Conjunctivae normal.      Pupils: Pupils are equal, round, and reactive to light. Neck:      Musculoskeletal: Normal range of motion and neck supple. Vascular: No JVD. Trachea: No tracheal deviation. Cardiovascular:      Rate and Rhythm: Normal rate and regular rhythm. Pulses: Normal pulses. Heart sounds: Normal heart sounds. No murmur. No friction rub. No gallop. Pulmonary:      Effort: Pulmonary effort is normal. No respiratory distress. Breath sounds: Normal breath sounds. No wheezing or rales. Chest:      Chest wall: No tenderness. Abdominal:      General: Bowel sounds are normal.      Palpations: Abdomen is soft. Tenderness: There is no abdominal tenderness. There is no guarding or rebound. Genitourinary:     Vagina: No vaginal discharge. Musculoskeletal: Normal range of motion. General: Swelling, tenderness, deformity and signs of injury present.       Comments: R shoulder - noted min ttp/     L wrist - noted deformity/ swelling/ bruising; skin intactpt has distal motor/ CV/ Sensation grossly intact to all 5 L fingers    Pt had central/ paraspinal C/T/L spines, Upper/Lower ext long bones, Abdomen,  Pelvis, hands /feet and all joints palpated and tolerated well (except as above) ; Pt has motor/ CV / Sensation grossly intact to all extremities;   Skin:     General: Skin is warm and dry. Capillary Refill: Capillary refill takes less than 2 seconds. Coloration: Skin is not pale. Findings: Bruising present. No erythema or rash. Neurological:      General: No focal deficit present. Mental Status: She is alert and oriented to person, place, and time. Cranial Nerves: No cranial nerve deficit. Sensory: No sensory deficit. Motor: No weakness or abnormal muscle tone. Coordination: Coordination normal.      Gait: Gait normal.      Deep Tendon Reflexes: Reflexes normal.      Comments: pt has motor/ CV/ Sensation grossly intact to all extremities, R = L in strength;   Psychiatric:         Behavior: Behavior normal.         Thought Content: Thought content normal.          MDM       Procedures      CONSULT  NOTE  11:32 PM  Chris Awad MD spoke with Dr Quiroz Staff. Specialty: Ortho  Discussed pt's hx, disposition, and available diagnostic and imaging results. Reviewed care plans. Consulting physician agrees with plans as outlined 'Please order some pain rx/ lidocaione/ muscle relaxer and we will see/ reduce her'; .  Pt told of plans and agrees;     4:45 AM  Daniela Garvey's  results have been reviewed with her. She has been counseled regarding her diagnosis. She verbally conveys understanding and agreement of the signs, symptoms, diagnosis, treatment and prognosis and additionally agrees to Call/ Arrange follow up as recommended with Dr. Alberto Roy MD in 24 - 48 hours. She also agrees with the care-plan and conveys that all of her questions have been answered.   I have also put together some discharge instructions for her that include: 1) educational information regarding their diagnosis, 2) how to care for their diagnosis at home, as well a 3) list of reasons why they would want to return to the ED prior to their follow-up appointment, should their condition change or for concerns.

## 2020-07-01 NOTE — DISCHARGE INSTRUCTIONS
Patient Education        Broken Wrist: Care Instructions  Your Care Instructions     Your wrist can break, or fracture, during sports, a fall, or other accidents. The break may happen when your wrist is hit or is used to protect you in a fall. Fractures can range from a small, hairline crack, to a bone or bones broken into two or more pieces. Your treatment depends on how bad the break is. Your doctor may have put your wrist in a cast or splint. This will help keep your wrist stable until your follow-up appointment. It may take weeks or months for your wrist to heal. You can help it heal with care at home. You heal best when you take good care of yourself. Eat a variety of healthy foods, and don't smoke. Follow-up care is a key part of your treatment and safety. Be sure to make and go to all appointments, and call your doctor if you are having problems. It's also a good idea to know your test results and keep a list of the medicines you take. How can you care for yourself at home? · Put ice or a cold pack on your wrist for 10 to 20 minutes at a time. Try to do this every 1 to 2 hours for the next 3 days (when you are awake). Put a thin cloth between the ice and your cast or splint. Keep your cast or splint dry. · Follow the splint or cast care instructions your doctor gives you. If you have a splint, do not take it off unless your doctor tells you to. Be careful not to put the splint on too tight. · Be safe with medicines. Take pain medicines exactly as directed. ? If the doctor gave you a prescription medicine for pain, take it as prescribed. ? If you are not taking a prescription pain medicine, ask your doctor if you can take an over-the-counter medicine. · Prop up your wrist on pillows when you sit or lie down in the first few days after the injury. Keep your wrist higher than the level of your heart. This will help reduce swelling.   · Move your fingers often to reduce swelling and stiffness, but do not use that hand to grab or carry anything. · Follow instructions for exercises to keep your arm strong. When should you call for help? Call your doctor now or seek immediate medical care if:  · You have new or worse pain. · Your hand or fingers are cool or pale or change color. · Your cast or splint feels too tight. · You have tingling, weakness, or numbness in your hand or fingers. Watch closely for changes in your health, and be sure to contact your doctor if:  · You do not get better as expected. · You have problems with your cast or splint. Where can you learn more? Go to http://natalie-hosea.info/  Enter J579 in the search box to learn more about \"Broken Wrist: Care Instructions. \"  Current as of: March 2, 2020               Content Version: 12.5  © 8606-6913 [employer], [employer]. Care instructions adapted under license by [employer] (which disclaims liability or warranty for this information). If you have questions about a medical condition or this instruction, always ask your healthcare professional. [address] any warranty or liability for your use of this information.

## 2020-07-01 NOTE — TELEPHONE ENCOUNTER
Patient's daughter states her mother was just d/c from [facility] at 1am today. Had multiple fractures in left arm and will be needing surgery. Was given dilaudid while in the hospital and sent home with a script for Ultram, but daughter cannot drive to get the script filled since she had shoulder surgery recently. Is asking if it is ok to give her some dilaudid that she has left over from her own surgery -- 2mg -- until she can get the other script filled.

## 2020-07-20 NOTE — TELEPHONE ENCOUNTER
Requested Prescriptions     Pending Prescriptions Disp Refills    losartan (COZAAR) 50 mg tablet 90 Tab 0         [facility] [address] [place], [place] - [address] [place] [address]  [phone]

## 2020-07-21 ENCOUNTER — PATIENT MESSAGE (OUTPATIENT)
Dept: INTERNAL MEDICINE CLINIC | Age: 75
End: 2020-07-21

## 2020-07-21 RX ORDER — LOSARTAN POTASSIUM 50 MG/1
TABLET ORAL
Qty: 90 TAB | Refills: 3 | Status: SHIPPED | OUTPATIENT
Start: 2020-07-21 | End: 2021-01-27 | Stop reason: SDUPTHER

## 2020-07-21 RX ORDER — LOSARTAN POTASSIUM 50 MG/1
TABLET ORAL
Qty: 90 TAB | Refills: 0 | Status: SHIPPED | OUTPATIENT
Start: 2020-07-21 | End: 2020-07-21 | Stop reason: SDUPTHER

## 2020-07-22 RX ORDER — LOSARTAN POTASSIUM 50 MG/1
TABLET ORAL
Qty: 90 TAB | Refills: 3 | OUTPATIENT
Start: 2020-07-22

## 2020-07-27 RX ORDER — ESCITALOPRAM OXALATE 10 MG/1
TABLET ORAL
Qty: 90 TAB | Refills: 1 | Status: SHIPPED | OUTPATIENT
Start: 2020-07-27 | End: 2021-01-27

## 2020-08-06 ENCOUNTER — VIRTUAL VISIT (OUTPATIENT)
Dept: INTERNAL MEDICINE CLINIC | Age: 75
End: 2020-08-06
Payer: MEDICARE

## 2020-08-06 DIAGNOSIS — J01.01 ACUTE RECURRENT MAXILLARY SINUSITIS: Primary | ICD-10-CM

## 2020-08-06 DIAGNOSIS — J30.1 NON-SEASONAL ALLERGIC RHINITIS DUE TO POLLEN: ICD-10-CM

## 2020-08-06 PROCEDURE — 99442 PR PHYS/QHP TELEPHONE EVALUATION 11-20 MIN: CPT | Performed by: INTERNAL MEDICINE

## 2020-08-06 RX ORDER — MINERAL OIL
ENEMA (ML) RECTAL
COMMUNITY

## 2020-08-06 RX ORDER — FLUTICASONE PROPIONATE 50 MCG
2 SPRAY, SUSPENSION (ML) NASAL
Qty: 1 BOTTLE | Refills: 5 | Status: SHIPPED | OUTPATIENT
Start: 2020-08-06 | End: 2021-01-19

## 2020-08-06 RX ORDER — CEFUROXIME AXETIL 500 MG/1
500 TABLET ORAL 2 TIMES DAILY
Qty: 14 TAB | Refills: 0 | Status: SHIPPED | OUTPATIENT
Start: 2020-08-06 | End: 2021-01-19 | Stop reason: ALTCHOICE

## 2020-08-06 NOTE — PROGRESS NOTES
Nadine Moss is a 77 y.o. female evaluated via telephone on 2020. Assessment & Plan:   Diagnoses and all orders for this visit:    1. Acute recurrent maxillary sinusitis -we will restart Flonase and treat with antibiotics. If not improving, consider steroids. 2. Non-seasonal allergic rhinitis due to pollen -continue Allegra and Flonase. Subjective:     Since last visit: Yes No: N/A. I communicated with the patient and/or health care decision maker today regardin week history of upper respiratory symptoms. She has had nasal congestion yellow in color with pressure in the ears. Some sinus headache. Some pain under her jawline. Some aches in the wrist and knees as well. She has been taking some Flonase and some Allegra with limited success. No shortness of breath or wheeze. No nausea or vomiting. This visit was initially a video visit but had to be converted to a phone call due to inability to connect via video. The following sections were reviewed and/or updated:  Patient Active Problem List    Diagnosis Date Noted    Fatigue due to sleep pattern disturbance 10/29/2018    SHANNAN (obstructive sleep apnea) 10/29/2018    Multiple sclerosis ([facility] [address] 2016    Advanced care planning/counseling discussion 2016    Neuropathic pain 2016    Costochondritis 2016    Chest pain 2016    GERD (gastroesophageal reflux disease) 2009    Hyperlipemia 2009    Osteopenia 2009    Carpal tunnel syndrome 2009    Hypertension 2009     Current Outpatient Medications   Medication Sig Dispense Refill    fexofenadine (ALLEGRA) 180 mg tablet Take  by mouth.  escitalopram oxalate (LEXAPRO) 10 mg tablet TAKE 1 TABLET DAILY 90 Tab 1    losartan (COZAAR) 50 mg tablet TAKE 1 TABLET DAILY 90 Tab 3    dextroamphetamine-amphetamine (ADDERALL) 10 mg tablet Take 1 Tab by mouth two (2) times a day.  Max Daily Amount: 20 mg. PT REQUEST THE \"BLUE PILL\" 180 Tab 0    gabapentin (NEURONTIN) 400 mg capsule Take 1 Cap by mouth three (3) times daily. 270 Cap 0    simvastatin (ZOCOR) 40 mg tablet Take 1 Tab by mouth nightly. LAB WORK REQUIRED FOR ADDITIONAL REFILLS 90 Tab 0    cyanocobalamin, vitamin B-12, 1,000 mcg lozg 1 Tab by SubLINGual route daily.  acetaminophen (TYLENOL) 325 mg tablet Take 2 Tabs by mouth two (2) times daily as needed for Pain.  camphor-menthol (SARNA) 0.5-0.5 % lotion Apply  to affected area as needed for Itching. 222 mL 0    cholecalciferol, vitamin D3, (VITAMIN D3) 2,000 unit tab Take 1 Tab by mouth daily.  fluticasone (FLONASE) 50 mcg/actuation nasal spray 2 Sprays by Both Nostrils route two (2) times daily as needed for Rhinitis. 1 Bottle     cetirizine (ZYRTEC) 10 mg tablet Take 10 mg by mouth daily as needed.        Allergies   Allergen Reactions    Augmentin [Amoxicillin-Pot Clavulanate] Other (comments)     \"sick\"    Sulfa (Sulfonamide Antibiotics) Hives and Rash    Zithromax [Azithromycin] Unknown (comments)     \"didn't work\"     Past Medical History:   Diagnosis Date    Arthritis 1980    Asthma 1996    Autoimmune disease ([facility] [address] 2001    Carpal tunnel syndrome 4/13/2009    Chronic pain     GERD (gastroesophageal reflux disease) 4/13/2009    Hyperlipemia 4/13/2009    Hypertension 4/13/2009    MS (multiple sclerosis) ([facility] [address] 1/1/2001    Osteopenia 4/13/2009    Vertigo      Past Surgical History:   Procedure Laterality Date    HX HYSTERECTOMY      HX LUMBAR DISKECTOMY      HX ORTHOPAEDIC  1976    [place] REMOVAL OF OVARIAN CYST(S)       Family History   Problem Relation Age of Onset    Hypertension Mother     High Cholesterol Mother     Cancer Father         prostate    Asthma Father     High Cholesterol Sister     Cancer Brother         prostate    High Cholesterol Brother     High Cholesterol Sister     Dementia Sister      Social History     Tobacco Use    Smoking status: Never Smoker    Smokeless tobacco: Never Used   Substance Use Topics    Alcohol use: Never     Comment: rarely - occ         Consent:  Bernardo Tran, who was seen by synchronous (real-time) audio only technology, and/or her healthcare decision maker, is aware that this patient-initiated, Telehealth encounter on 8/6/2020 is a billable service. She is aware that she may receive a bill for any such additional services and has provided verbal consent to proceed: Yes. Patient identification was verified prior to start of the visit. A caregiver was present when appropriate. Due to this being a TeleHealth encounter (during [facility] emergency), evaluation of the following organ systems was limited: VS/Constitutional/EENT/Resp/CV/GI//MS/Neuro/Skin/Heme-Lymph-Imm. Pursuant to the emergency declaration under the [facility], 1135 waiver authority and the [employer] and [employer] Act, this Virtual Visit was conducted, with patient's (and/or legal guardian's) consent, to reduce the patient's risk of exposure to COVID-19 and provide necessary medical care. Services were provided through a synchronous discussion virtually to substitute for in-person clinic visit. I was in the office. The patient was at home. I affirm this is a Patient Initiated Episode with an Established Patient who has not had a related appointment within my department in the past 7 days or scheduled within the next 24 hours. Total Time: minutes: 5-10 minutes Call of 9 min 30 sec.      Note: not billable if this call serves to triage the patient into an appointment for the relevant concern    Guerrero Garcia MD

## 2020-08-16 DIAGNOSIS — G35 MS (MULTIPLE SCLEROSIS) (HCC): ICD-10-CM

## 2020-08-16 RX ORDER — GABAPENTIN 400 MG/1
CAPSULE ORAL
Qty: 270 CAP | Refills: 0 | Status: SHIPPED | OUTPATIENT
Start: 2020-08-16 | End: 2020-12-10

## 2020-10-07 ENCOUNTER — CLINICAL SUPPORT (OUTPATIENT)
Dept: INTERNAL MEDICINE CLINIC | Age: 75
End: 2020-10-07
Payer: MEDICARE

## 2020-10-07 VITALS — TEMPERATURE: 97.6 F

## 2020-10-07 DIAGNOSIS — Z23 ENCOUNTER FOR IMMUNIZATION: Primary | ICD-10-CM

## 2020-10-07 PROCEDURE — 90694 VACC AIIV4 NO PRSRV 0.5ML IM: CPT | Performed by: INTERNAL MEDICINE

## 2020-10-07 NOTE — PATIENT INSTRUCTIONS
Influenza (Flu) Vaccine (Inactivated or Recombinant): What You Need to Know Why get vaccinated? Influenza vaccine can prevent influenza (flu). Flu is a contagious disease that spreads around the [place] every year, usually between October and May. Anyone can get the flu, but it is more dangerous for some people. Infants and young children, people 72years of age and older, pregnant women, and people with certain health conditions or a weakened immune system are at greatest risk of flu complications. Pneumonia, bronchitis, sinus infections and ear infections are examples of flu-related complications. If you have a medical condition, such as heart disease, cancer or diabetes, flu can make it worse. Flu can cause fever and chills, sore throat, muscle aches, fatigue, cough, headache, and runny or stuffy nose. Some people may have vomiting and diarrhea, though this is more common in children than adults. Each year, thousands of people in the [facility] die from flu, and many more are hospitalized. Flu vaccine prevents millions of illnesses and flu-related visits to the doctor each year. Influenza vaccine CDC recommends everyone 10months of age and older get vaccinated every flu season. Children 6 months through 6years of age may need 2 doses during a single flu season. Everyone else needs only 1 dose each flu season. It takes about 2 weeks for protection to develop after vaccination. There are many flu viruses, and they are always changing. Each year a new flu vaccine is made to protect against three or four viruses that are likely to cause disease in the upcoming flu season. Even when the vaccine doesn't exactly match these viruses, it may still provide some protection. Influenza vaccine does not cause flu. Influenza vaccine may be given at the same time as other vaccines. Talk with your health care provider Tell your vaccine provider if the person getting the vaccine: · Has had an allergic reaction after a previous dose of influenza vaccine, or has any severe, life-threatening allergies. · Has ever had Guillain-Barré Syndrome (also called GBS). In some cases, your health care provider may decide to postpone influenza vaccination to a future visit. People with minor illnesses, such as a cold, may be vaccinated. People who are moderately or severely ill should usually wait until they recover before getting influenza vaccine. Your health care provider can give you more information. Risks of a vaccine reaction · Soreness, redness, and swelling where shot is given, fever, muscle aches, and headache can happen after influenza vaccine. · There may be a very small increased risk of Guillain-Barré Syndrome (GBS) after inactivated influenza vaccine (the flu shot). Abdiel Sosa children who get the flu shot along with pneumococcal vaccine (PCV13), and/or DTaP vaccine at the same time might be slightly more likely to have a seizure caused by fever. Tell your health care provider if a child who is getting flu vaccine has ever had a seizure. People sometimes faint after medical procedures, including vaccination. Tell your provider if you feel dizzy or have vision changes or ringing in the ears. As with any medicine, there is a very remote chance of a vaccine causing a severe allergic reaction, other serious injury, or death. What if there is a serious problem? An allergic reaction could occur after the vaccinated person leaves the clinic. If you see signs of a severe allergic reaction (hives, swelling of the face and throat, difficulty breathing, a fast heartbeat, dizziness, or weakness), call 9-1-1 and get the person to the nearest hospital. 
For other signs that concern you, call your health care provider. Adverse reactions should be reported to the Vaccine Adverse Event Reporting System (VAERS). Your health care provider will usually file this report, or you can do it yourself. Visit the VAERS website at www.vaers. hhs.gov or call 7-910.772.3437. VAERS is only for reporting reactions, and VAERS staff do not give medical advice. The National Vaccine Injury Compensation Program 
The National Vaccine Injury Compensation Program (VICP) is a federal program that was created to compensate people who may have been injured by certain vaccines. Visit the VICP website at www.hrsa.gov/vaccinecompensation or call 2-228.579.4520 to learn about the program and about filing a claim. There is a time limit to file a claim for compensation. How can I learn more? · Ask your healthcare provider. · Call your local or state health department. · Contact the Centers for Disease Control and Prevention (CDC): 
? Call 6-966.895.2033 (1-800-CDC-INFO) or 
? Visit CDC's website at www.cdc.gov/flu Vaccine Information Statement (Interim) Inactivated Influenza Vaccine 8/15/2019 
[address] [id number] [facility] and adjust Centers for Disease Control and Prevention Many Vaccine Information Statements are available in English and other languages. See www.immunize.org/vis. Muchas hojas de información sobre vacunas están disponibles en español y en otros idiomas. Visite www.immunize.org/vis. Care instructions adapted under license by [employer] (which disclaims liability or warranty for this information). If you have questions about a medical condition or this instruction, always ask your healthcare professional. [place] [address] any warranty or liability for your use of this information. Preventing Falls: Care Instructions Your Care Instructions Getting around your home safely can be a challenge if you have injuries or health problems that make it easy for you to fall. Loose rugs and furniture in walkways are among the dangers for many older people who have problems walking or who have poor eyesight. People who have conditions such as arthritis, osteoporosis, or dementia also have to be careful not to fall. You can make your home safer with a few simple measures. Follow-up care is a key part of your treatment and safety. Be sure to make and go to all appointments, and call your doctor if you are having problems. It's also a good idea to know your test results and keep a list of the medicines you take. How can you care for yourself at home? Taking care of yourself · You may get dizzy if you do not drink enough water. To prevent dehydration, drink plenty of fluids, enough so that your urine is light yellow or clear like water. Choose water and other caffeine-free clear liquids. If you have kidney, heart, or liver disease and have to limit fluids, talk with your doctor before you increase the amount of fluids you drink. · Exercise regularly to improve your strength, muscle tone, and balance. Walk if you can. Swimming may be a good choice if you cannot walk easily. · Have your vision and hearing checked each year or any time you notice a change. If you have trouble seeing and hearing, you might not be able to avoid objects and could lose your balance. · Know the side effects of the medicines you take. Ask your doctor or pharmacist whether the medicines you take can affect your balance. Sleeping pills or sedatives can affect your balance. · Limit the amount of alcohol you drink. Alcohol can impair your balance and other senses. · Ask your doctor whether calluses or corns on your feet need to be removed. If you wear loose-fitting shoes because of calluses or corns, you can lose your balance and fall. · Talk to your doctor if you have numbness in your feet. Preventing falls at home · Remove raised doorway thresholds, throw rugs, and clutter. Repair loose carpet or raised areas in the floor. · Move furniture and electrical cords to keep them out of walking paths. · Use nonskid floor wax, and wipe up spills right away, especially on ceramic tile floors. · If you use a walker or cane, put rubber tips on it. If you use crutches, clean the bottoms of them regularly with an abrasive pad, such as steel wool. · Keep your house well lit, especially [facility], and outside walkways. Use night-lights in areas such as hallways and bathrooms. Add extra light switches or use remote switches (such as switches that go on or off when you clap your hands) to make it easier to turn lights on if you have to get up during the night. · Install sturdy handrails on stairways. · Move items in your cabinets so that the things you use a lot are on the lower shelves (about waist level). · Keep a cordless phone and a flashlight with new batteries by your bed. If possible, put a phone in each of the main rooms of your house, or carry a cell phone in case you fall and cannot reach a phone. Or, you can wear a device around your neck or wrist. You push a button that sends a signal for help. · Wear low-heeled shoes that fit well and give your feet good support. Use footwear with nonskid soles. Check the heels and soles of your shoes for wear. Repair or replace worn heels or soles. · Do not wear socks without shoes on wood floors. · Walk on the grass when the sidewalks are slippery. If you live in an area that gets snow and ice in the winter, sprinkle salt on slippery steps and sidewalks. Preventing falls in the bath · Install grab bars and nonskid mats inside and outside your shower or tub and near the toilet and sinks. · Use shower chairs and bath benches. · Use a hand-held shower head that will allow you to sit while showering. · Get into a tub or shower by putting the weaker leg in first. Get out of a tub or shower with your strong side first. 
· Repair loose toilet seats and consider installing a raised toilet seat to make getting on and off the toilet easier. · Keep your bathroom door unlocked while you are in the shower. Where can you learn more? Go to http://www.valentino.com/ Enter [phone] in the search box to learn more about \"Preventing Falls: Care Instructions. \" Current as of: April 15, 2020               Content Version: 12.6 © 3500-7393 [employer]. Care instructions adapted under license by [employer] (which disclaims liability or warranty for this information). If you have questions about a medical condition or this instruction, always ask your healthcare professional. [address] any warranty or liability for your use of this information.

## 2020-12-09 DIAGNOSIS — G35 MS (MULTIPLE SCLEROSIS) (HCC): ICD-10-CM

## 2020-12-09 DIAGNOSIS — G47.10 HYPERSOMNOLENCE: ICD-10-CM

## 2020-12-09 RX ORDER — DEXTROAMPHETAMINE SACCHARATE, AMPHETAMINE ASPARTATE, DEXTROAMPHETAMINE SULFATE AND AMPHETAMINE SULFATE 2.5; 2.5; 2.5; 2.5 MG/1; MG/1; MG/1; MG/1
10 TABLET ORAL 2 TIMES DAILY
Qty: 180 TAB | Refills: 0 | Status: SHIPPED | OUTPATIENT
Start: 2020-12-09 | End: 2021-06-11 | Stop reason: SDUPTHER

## 2020-12-10 DIAGNOSIS — G35 MS (MULTIPLE SCLEROSIS) (HCC): ICD-10-CM

## 2020-12-10 RX ORDER — GABAPENTIN 400 MG/1
CAPSULE ORAL
Qty: 270 CAP | Refills: 0 | Status: SHIPPED | OUTPATIENT
Start: 2020-12-10 | End: 2021-06-11

## 2020-12-22 RX ORDER — SIMVASTATIN 40 MG/1
40 TABLET, FILM COATED ORAL
Qty: 90 TAB | Refills: 0 | Status: SHIPPED | OUTPATIENT
Start: 2020-12-22 | End: 2021-03-23 | Stop reason: SDUPTHER

## 2021-01-15 ENCOUNTER — PATIENT MESSAGE (OUTPATIENT)
Dept: INTERNAL MEDICINE CLINIC | Age: 76
End: 2021-01-15

## 2021-01-19 ENCOUNTER — VIRTUAL VISIT (OUTPATIENT)
Dept: INTERNAL MEDICINE CLINIC | Age: 76
End: 2021-01-19
Payer: MEDICARE

## 2021-01-19 DIAGNOSIS — J01.01 ACUTE RECURRENT MAXILLARY SINUSITIS: Primary | ICD-10-CM

## 2021-01-19 PROCEDURE — G8399 PT W/DXA RESULTS DOCUMENT: HCPCS | Performed by: INTERNAL MEDICINE

## 2021-01-19 PROCEDURE — G8421 BMI NOT CALCULATED: HCPCS | Performed by: INTERNAL MEDICINE

## 2021-01-19 PROCEDURE — G8536 NO DOC ELDER MAL SCRN: HCPCS | Performed by: INTERNAL MEDICINE

## 2021-01-19 PROCEDURE — 1090F PRES/ABSN URINE INCON ASSESS: CPT | Performed by: INTERNAL MEDICINE

## 2021-01-19 PROCEDURE — 3017F COLORECTAL CA SCREEN DOC REV: CPT | Performed by: INTERNAL MEDICINE

## 2021-01-19 PROCEDURE — 1101F PT FALLS ASSESS-DOCD LE1/YR: CPT | Performed by: INTERNAL MEDICINE

## 2021-01-19 PROCEDURE — G9708 BILAT MAST/HX BI /UNILAT MAS: HCPCS | Performed by: INTERNAL MEDICINE

## 2021-01-19 PROCEDURE — 99213 OFFICE O/P EST LOW 20 MIN: CPT | Performed by: INTERNAL MEDICINE

## 2021-01-19 PROCEDURE — G8432 DEP SCR NOT DOC, RNG: HCPCS | Performed by: INTERNAL MEDICINE

## 2021-01-19 PROCEDURE — G8428 CUR MEDS NOT DOCUMENT: HCPCS | Performed by: INTERNAL MEDICINE

## 2021-01-19 PROCEDURE — G0463 HOSPITAL OUTPT CLINIC VISIT: HCPCS | Performed by: INTERNAL MEDICINE

## 2021-01-19 PROCEDURE — G8756 NO BP MEASURE DOC: HCPCS | Performed by: INTERNAL MEDICINE

## 2021-01-19 RX ORDER — CEFUROXIME AXETIL 500 MG/1
500 TABLET ORAL 2 TIMES DAILY
Qty: 20 TAB | Refills: 0 | Status: SHIPPED | OUTPATIENT
Start: 2021-01-19 | End: 2021-06-01 | Stop reason: ALTCHOICE

## 2021-01-19 RX ORDER — FAMOTIDINE 20 MG/1
20 TABLET, FILM COATED ORAL 2 TIMES DAILY
COMMUNITY
Start: 2021-01-19

## 2021-01-19 RX ORDER — FLUTICASONE PROPIONATE 50 MCG
2 SPRAY, SUSPENSION (ML) NASAL
Qty: 1 BOTTLE | Refills: 5 | COMMUNITY
Start: 2021-01-19 | End: 2022-03-25 | Stop reason: SDUPTHER

## 2021-01-20 NOTE — PROGRESS NOTES
Jose Guadalupe Bolton is a 77 y.o. female who was seen by synchronous (real-time) audio-video technology on 1/19/2021. Assessment & Plan:   Diagnoses and all orders for this visit:    1. Acute recurrent maxillary sinusitis - will add antibiotics as well as increase nasal steroids and use mucinex as needed. Other orders  -     cefUROXime (CEFTIN) 500 mg tablet; Take 1 Tab by mouth two (2) times a day. Subjective:   Jose Guadalupe Bolton was seen for Sinus Infection (x 6 days )      Since last visit: N/A    Presents with a several day history of increasing mucus from her nose. She has sinus pressure across the maxillary sinus as well as green nasal discharge and postnasal drip. No fevers or chills. No sweats. No nausea or vomiting. No change in bowel or bladder habits. Prior to Admission medications    Medication Sig Start Date End Date Taking? Authorizing Provider   fluticasone propionate (FLONASE) 50 mcg/actuation nasal spray 2 Sprays by Both Nostrils route two (2) times daily as needed for Rhinitis. 1/19/21  Yes Henry Jordan MD   famotidine (PEPCID) 20 mg tablet Take 1 Tab by mouth two (2) times a day. 1/19/21  Yes Henry Jordan MD   cefUROXime (CEFTIN) 500 mg tablet Take 1 Tab by mouth two (2) times a day. 1/19/21  Yes Henry Jordan MD   simvastatin (ZOCOR) 40 mg tablet TAKE 1 TAB BY MOUTH NIGHTLY. LAB WORK REQUIRED FOR ADDITIONAL REFILLS 12/22/20  Yes Henry Jordan MD   gabapentin (NEURONTIN) 400 mg capsule TAKE 1 CAPSULE 3 TIMES A   DAY 12/10/20  Yes Henry Jordan MD   dextroamphetamine-amphetamine (ADDERALL) 10 mg tablet Take 1 Tab by mouth two (2) times a day. Max Daily Amount: 20 mg. PT REQUEST THE \"BLUE PILL\" 12/9/20  Yes Henry Jordan MD   fexofenadine (ALLEGRA) 180 mg tablet Take  by mouth.    Yes Provider, Historical   escitalopram oxalate (LEXAPRO) 10 mg tablet TAKE 1 TABLET DAILY 7/27/20  Yes Zoltan Edwards III, MD   losartan (COZAAR) 50 mg tablet TAKE 1 TABLET DAILY 7/21/20  Yes Thomas Rogers MD   cyanocobalamin, vitamin B-12, 1,000 mcg lozg 1 Tab by SubLINGual route daily. Yes Provider, Historical   acetaminophen (TYLENOL) 325 mg tablet Take 2 Tabs by mouth two (2) times daily as needed for Pain. 8/21/19  Yes Thomas Rogers MD   cetirizine (ZYRTEC) 10 mg tablet Take 10 mg by mouth daily as needed. 10/29/18  Yes Thomas Rogers MD   camphor-menthol [facility] [facility]) 0.5-0.5 % lotion Apply  to affected area as needed for Itching. 1/14/16  Yes Thomas Rogers MD   cholecalciferol, vitamin D3, (VITAMIN D3) 2,000 unit tab Take 1 Tab by mouth daily. Yes Provider, Historical   fluticasone propionate (FLONASE) 50 mcg/actuation nasal spray 2 Sprays by Both Nostrils route two (2) times daily as needed for Rhinitis. Patient taking differently: 2 Sprays by Both Nostrils route daily. 8/6/20 1/19/21  Danny Beasley III, MD   cefUROXime (CEFTIN) 500 mg tablet Take 1 Tab by mouth two (2) times a day. 8/6/20 1/19/21  Thomas Rogers MD       Patient Active Problem List    Diagnosis Date Noted    Fatigue due to sleep pattern disturbance 10/29/2018    SHANNAN (obstructive sleep apnea) 10/29/2018    Multiple sclerosis ([facility] [address] 11/16/2016    Advanced care planning/counseling discussion 07/29/2016    Neuropathic pain 07/29/2016    Costochondritis 07/29/2016    Chest pain 07/20/2016    GERD (gastroesophageal reflux disease) 04/13/2009    Hyperlipemia 04/13/2009    Osteopenia 04/13/2009    Carpal tunnel syndrome 04/13/2009    Hypertension 04/13/2009     Current Outpatient Medications   Medication Sig Dispense Refill    fluticasone propionate (FLONASE) 50 mcg/actuation nasal spray 2 Sprays by Both Nostrils route two (2) times daily as needed for Rhinitis. 1 Bottle 5    famotidine (PEPCID) 20 mg tablet Take 1 Tab by mouth two (2) times a day.  cefUROXime (CEFTIN) 500 mg tablet Take 1 Tab by mouth two (2) times a day.  20 Tab 0    simvastatin (ZOCOR) 40 mg tablet TAKE 1 TAB BY MOUTH NIGHTLY. LAB WORK REQUIRED FOR ADDITIONAL REFILLS 90 Tab 0    gabapentin (NEURONTIN) 400 mg capsule TAKE 1 CAPSULE 3 TIMES A    Cap 0    dextroamphetamine-amphetamine (ADDERALL) 10 mg tablet Take 1 Tab by mouth two (2) times a day. Max Daily Amount: 20 mg. PT REQUEST THE \"BLUE PILL\" 180 Tab 0    fexofenadine (ALLEGRA) 180 mg tablet Take  by mouth.  escitalopram oxalate (LEXAPRO) 10 mg tablet TAKE 1 TABLET DAILY 90 Tab 1    losartan (COZAAR) 50 mg tablet TAKE 1 TABLET DAILY 90 Tab 3    cyanocobalamin, vitamin B-12, 1,000 mcg lozg 1 Tab by SubLINGual route daily.  acetaminophen (TYLENOL) 325 mg tablet Take 2 Tabs by mouth two (2) times daily as needed for Pain.  cetirizine (ZYRTEC) 10 mg tablet Take 10 mg by mouth daily as needed.  camphor-menthol (SARNA) 0.5-0.5 % lotion Apply  to affected area as needed for Itching. 222 mL 0    cholecalciferol, vitamin D3, (VITAMIN D3) 2,000 unit tab Take 1 Tab by mouth daily.        Allergies   Allergen Reactions    Augmentin [Amoxicillin-Pot Clavulanate] Other (comments)     \"sick\"    Sulfa (Sulfonamide Antibiotics) Hives and Rash    Zithromax [Azithromycin] Unknown (comments)     \"didn't work\"     Past Medical History:   Diagnosis Date    Arthritis 1980    Asthma 1996    Autoimmune disease ([facility] [address] 2001    Carpal tunnel syndrome 4/13/2009    Chronic pain     GERD (gastroesophageal reflux disease) 4/13/2009    Hyperlipemia 4/13/2009    Hypertension 4/13/2009    MS (multiple sclerosis) ([facility] [address] 1/1/2001    Osteopenia 4/13/2009    Vertigo      Past Surgical History:   Procedure Laterality Date    HX HYSTERECTOMY      HX LUMBAR DISKECTOMY      HX ORTHOPAEDIC  1976    [place] REMOVAL OF OVARIAN CYST(S)       Family History   Problem Relation Age of Onset    Hypertension Mother     High Cholesterol Mother     Cancer Father         prostate    Asthma Father     High Cholesterol Sister     Cancer Brother         prostate    High Cholesterol Brother     High Cholesterol Sister     Dementia Sister      Social History     Tobacco Use    Smoking status: Never Smoker    Smokeless tobacco: Never Used   Substance Use Topics    Alcohol use: Never     Comment: rarely - occ       ROS - per HPI      Objective:   No flowsheet data found. General: alert, cooperative, no distress   Mental  status: normal mood, behavior, speech, dress, motor activity, and thought processes, able to follow commands   Eyes: EOM intact, normal sclera   Mouth: Tender over the maxillary sinus bilaterally. and mucous membranes moist   Neck: no visualized mass   Resp: normal effort and no respiratory distress   Neuro: no gross deficits   Musculoskeletal: normal ROM of neck   Skin: no discoloration or lesions of concern on visible areas   Psychiatric: normal affect, no hallucinations             We discussed the expected course, resolution and complications of the diagnosis(es) in detail. Medication risks, benefits, costs, interactions, and alternatives were discussed as indicated. I advised her to contact the office if her condition worsens, changes or fails to improve as anticipated. She expressed understanding with the diagnosis(es) and plan. Edelmira Pena is a 77 y.o. female who was evaluated by a video visit encounter for concerns as above. Patient identification was verified prior to start of the visit. A caregiver was present when appropriate. Due to this being a TeleHealth encounter (During [facility] public [facility] emergency), evaluation of the following organ systems was limited: Vitals/Constitutional/EENT/Resp/CV/GI//MS/Neuro/Skin/Heme-Lymph-Imm.   Pursuant to the emergency declaration under the [facility], [facility]5 waiver authority and the [email or web address] and [employer] Act, this Virtual  Visit was conducted, with patient's (and/or legal guardian's) consent, to reduce the patient's risk of exposure to COVID-19 and provide necessary medical care. Services were provided through a synchronous discussion virtually to substitute for in-person clinic visit. I was in the office. The patient was at home.     Gloria Irby MD

## 2021-01-27 ENCOUNTER — PATIENT MESSAGE (OUTPATIENT)
Dept: INTERNAL MEDICINE CLINIC | Age: 76
End: 2021-01-27

## 2021-01-27 RX ORDER — LOSARTAN POTASSIUM 50 MG/1
TABLET ORAL
Qty: 90 TAB | Refills: 3 | Status: SHIPPED | OUTPATIENT
Start: 2021-01-27 | End: 2021-10-01

## 2021-01-27 RX ORDER — ESCITALOPRAM OXALATE 10 MG/1
TABLET ORAL
Qty: 90 TAB | Refills: 1 | Status: SHIPPED | OUTPATIENT
Start: 2021-01-27 | End: 2021-09-23

## 2021-01-27 RX ORDER — LOSARTAN POTASSIUM 50 MG/1
TABLET ORAL
Qty: 90 TAB | Refills: 3 | Status: SHIPPED | OUTPATIENT
Start: 2021-01-27 | End: 2021-01-27 | Stop reason: SDUPTHER

## 2021-03-24 RX ORDER — SIMVASTATIN 40 MG/1
40 TABLET, FILM COATED ORAL
Qty: 90 TAB | Refills: 0 | Status: SHIPPED | OUTPATIENT
Start: 2021-03-24 | End: 2021-10-04 | Stop reason: ALTCHOICE

## 2021-04-02 ENCOUNTER — TELEPHONE (OUTPATIENT)
Dept: INTERNAL MEDICINE CLINIC | Age: 76
End: 2021-04-02

## 2021-04-02 NOTE — TELEPHONE ENCOUNTER
Returned call to son Ginger Garza on HIPAA - advised per [facility] can use tyelenol prn for symptoms. Red flags reviewed, he verbalized understanding.

## 2021-04-02 NOTE — TELEPHONE ENCOUNTER
Lesley Dupree [phone] Her Son      Pt is having body aches headache and muscle pain.  She had her 2nd COVID vaccine yesterday

## 2021-06-01 ENCOUNTER — VIRTUAL VISIT (OUTPATIENT)
Dept: INTERNAL MEDICINE CLINIC | Age: 76
End: 2021-06-01
Payer: MEDICARE

## 2021-06-01 DIAGNOSIS — J06.9 UPPER RESPIRATORY TRACT INFECTION, UNSPECIFIED TYPE: Primary | ICD-10-CM

## 2021-06-01 DIAGNOSIS — G35 MULTIPLE SCLEROSIS (HCC): ICD-10-CM

## 2021-06-01 PROCEDURE — G8427 DOCREV CUR MEDS BY ELIG CLIN: HCPCS | Performed by: INTERNAL MEDICINE

## 2021-06-01 PROCEDURE — 1101F PT FALLS ASSESS-DOCD LE1/YR: CPT | Performed by: INTERNAL MEDICINE

## 2021-06-01 PROCEDURE — G0463 HOSPITAL OUTPT CLINIC VISIT: HCPCS | Performed by: INTERNAL MEDICINE

## 2021-06-01 PROCEDURE — G8421 BMI NOT CALCULATED: HCPCS | Performed by: INTERNAL MEDICINE

## 2021-06-01 PROCEDURE — 1090F PRES/ABSN URINE INCON ASSESS: CPT | Performed by: INTERNAL MEDICINE

## 2021-06-01 PROCEDURE — 3017F COLORECTAL CA SCREEN DOC REV: CPT | Performed by: INTERNAL MEDICINE

## 2021-06-01 PROCEDURE — G8536 NO DOC ELDER MAL SCRN: HCPCS | Performed by: INTERNAL MEDICINE

## 2021-06-01 PROCEDURE — G8756 NO BP MEASURE DOC: HCPCS | Performed by: INTERNAL MEDICINE

## 2021-06-01 PROCEDURE — 99213 OFFICE O/P EST LOW 20 MIN: CPT | Performed by: INTERNAL MEDICINE

## 2021-06-01 PROCEDURE — G8399 PT W/DXA RESULTS DOCUMENT: HCPCS | Performed by: INTERNAL MEDICINE

## 2021-06-01 PROCEDURE — G8432 DEP SCR NOT DOC, RNG: HCPCS | Performed by: INTERNAL MEDICINE

## 2021-06-01 RX ORDER — DOXYCYCLINE 100 MG/1
100 TABLET ORAL 2 TIMES DAILY
Qty: 20 TABLET | Refills: 0 | OUTPATIENT
Start: 2021-06-01 | End: 2021-06-01 | Stop reason: SDUPTHER

## 2021-06-01 RX ORDER — CODEINE PHOSPHATE AND GUAIFENESIN 10; 100 MG/5ML; MG/5ML
10 SOLUTION ORAL
Qty: 180 ML | Refills: 0 | OUTPATIENT
Start: 2021-06-01 | End: 2021-06-08

## 2021-06-01 RX ORDER — CODEINE PHOSPHATE AND GUAIFENESIN 10; 100 MG/5ML; MG/5ML
10 SOLUTION ORAL
Qty: 180 ML | Refills: 0 | Status: SHIPPED | OUTPATIENT
Start: 2021-06-01 | End: 2021-06-08

## 2021-06-01 RX ORDER — DOXYCYCLINE 100 MG/1
100 TABLET ORAL 2 TIMES DAILY
Qty: 20 TABLET | Refills: 0 | Status: SHIPPED | OUTPATIENT
Start: 2021-06-01 | End: 2021-06-11

## 2021-06-01 NOTE — PROGRESS NOTES
Nydia Hartmann is a 77 y.o. female who was seen by synchronous (real-time) audio-video technology on 6/1/2021. Assessment & Plan:   Below is the assessment and plan developed based on review of pertinent history, physical exam (if applicable), labs, studies, and medications. 1. Upper respiratory tract infection, unspecified type -? Bronchitis. Given her compromised state, will treat with antibiotics and cough suppressants. She will continue to use Tylenol as needed for fever. She will continue her Flonase and Allegra. -     guaiFENesin-codeine (Cheratussin AC) 100-10 mg/5 mL solution; Take 10 mL by mouth four (4) times daily as needed for Cough for up to 7 days. Max Daily Amount: 40 mL., Phone In, Disp-180 mL, R-0Not to exceed 5 additional fills before 03/26/2016  -     guaiFENesin-codeine (Cheratussin AC) 100-10 mg/5 mL solution; Take 10 mL by mouth four (4) times daily as needed for Cough for up to 7 days. Max Daily Amount: 40 mL., Normal, Disp-180 mL, R-0Meds to be delivered to patient by  OF [facility]. [facility]  2. Multiple sclerosis ([address] -has not seen a neurologist in several years and is not on treatment. Apparently her neurologist suggested that she stop all treatment as he did not feel it was beneficial at her age. Encouraged her to seek a new neurology opinion as her may be new options to consider. Subjective:   Nydia Hartmann was seen for Fever, Cough, and Generalized Body Aches      Since last visit: N/A    Presents with a 4-day history of upper respiratory symptoms. She has had a fever over 101. She has had nasal congestion green in color and cough productive of green sputum. No shortness of breath or wheeze. No nausea or vomiting. No diarrhea. No change in smell or taste. No unusual exposures. Prior to Admission medications    Medication Sig Start Date End Date Taking?  Authorizing Provider   guaiFENesin-codeine (Cheratussin AC) 100-10 mg/5 mL solution Take 10 mL by mouth four (4) times daily as needed for Cough for up to 7 days. Max Daily Amount: 40 mL. 6/1/21 6/8/21 Yes Anh Saba MD   doxycycline (ADOXA) 100 mg tablet Take 1 Tablet by mouth two (2) times a day for 10 days. 6/1/21 6/11/21 Yes Anh Saba MD   guaiFENesin-codeine (Cheratussin AC) 100-10 mg/5 mL solution Take 10 mL by mouth four (4) times daily as needed for Cough for up to 7 days. Max Daily Amount: 40 mL. 6/1/21 6/8/21 Yes Anh Saba MD   simvastatin (ZOCOR) 40 mg tablet Take 1 Tab by mouth nightly. 3/24/21  Yes Anh Saba MD   escitalopram oxalate (LEXAPRO) 10 mg tablet TAKE 1 TABLET DAILY 1/27/21  Yes Anh Saba MD   losartan (COZAAR) 50 mg tablet TAKE 1 TABLET DAILY 1/27/21  Yes Bell Daniel III, MD   fluticasone propionate (FLONASE) 50 mcg/actuation nasal spray 2 Sprays by Both Nostrils route two (2) times daily as needed for Rhinitis. 1/19/21  Yes Anh Saba MD   famotidine (PEPCID) 20 mg tablet Take 1 Tab by mouth two (2) times a day. 1/19/21  Yes Anh Saba MD   gabapentin (NEURONTIN) 400 mg capsule TAKE 1 CAPSULE 3 TIMES A   DAY 12/10/20  Yes Anh Saba MD   dextroamphetamine-amphetamine (ADDERALL) 10 mg tablet Take 1 Tab by mouth two (2) times a day. Max Daily Amount: 20 mg. PT REQUEST THE \"BLUE PILL\" 12/9/20  Yes Anh Saba MD   fexofenadine (ALLEGRA) 180 mg tablet Take  by mouth. Yes Provider, Historical   cyanocobalamin, vitamin B-12, 1,000 mcg lozg 1 Tab by SubLINGual route daily. Yes Provider, Historical   acetaminophen (TYLENOL) 325 mg tablet Take 2 Tabs by mouth two (2) times daily as needed for Pain. 8/21/19  Yes Anh Saba MD   cetirizine (ZYRTEC) 10 mg tablet Take 10 mg by mouth daily as needed. 10/29/18  Yes Anh Saba MD   camphor-menthol [facility]) 0.5-0.5 % lotion Apply  to affected area as needed for Itching.  1/14/16  Yes Anh Saba MD cholecalciferol, vitamin D3, (VITAMIN D3) 2,000 unit tab Take 1 Tab by mouth daily. Yes Provider, Historical   doxycycline (ADOXA) 100 mg tablet Take 1 Tablet by mouth two (2) times a day. 6/1/21 6/1/21  Celina Stockton III, MD   cefUROXime (CEFTIN) 500 mg tablet Take 1 Tab by mouth two (2) times a day. 1/19/21 6/1/21  Driss Kim MD       Patient Active Problem List    Diagnosis Date Noted    Fatigue due to sleep pattern disturbance 10/29/2018    SHANNAN (obstructive sleep apnea) 10/29/2018    Multiple sclerosis ([facility] 11/16/2016    Advanced care planning/counseling discussion 07/29/2016    Neuropathic pain 07/29/2016    Costochondritis 07/29/2016    Chest pain 07/20/2016    GERD (gastroesophageal reflux disease) 04/13/2009    Hyperlipemia 04/13/2009    Osteopenia 04/13/2009    Carpal tunnel syndrome 04/13/2009    Hypertension 04/13/2009     Current Outpatient Medications   Medication Sig Dispense Refill    guaiFENesin-codeine (Cheratussin AC) 100-10 mg/5 mL solution Take 10 mL by mouth four (4) times daily as needed for Cough for up to 7 days. Max Daily Amount: 40 mL. 180 mL 0    doxycycline (ADOXA) 100 mg tablet Take 1 Tablet by mouth two (2) times a day for 10 days. 20 Tablet 0    guaiFENesin-codeine (Cheratussin AC) 100-10 mg/5 mL solution Take 10 mL by mouth four (4) times daily as needed for Cough for up to 7 days. Max Daily Amount: 40 mL. 180 mL 0    simvastatin (ZOCOR) 40 mg tablet Take 1 Tab by mouth nightly. 90 Tab 0    escitalopram oxalate (LEXAPRO) 10 mg tablet TAKE 1 TABLET DAILY 90 Tab 1    losartan (COZAAR) 50 mg tablet TAKE 1 TABLET DAILY 90 Tab 3    fluticasone propionate (FLONASE) 50 mcg/actuation nasal spray 2 Sprays by Both Nostrils route two (2) times daily as needed for Rhinitis. 1 Bottle 5    famotidine (PEPCID) 20 mg tablet Take 1 Tab by mouth two (2) times a day.       gabapentin (NEURONTIN) 400 mg capsule TAKE 1 CAPSULE 3 TIMES A    Cap 0    dextroamphetamine-amphetamine (ADDERALL) 10 mg tablet Take 1 Tab by mouth two (2) times a day. Max Daily Amount: 20 mg. PT REQUEST THE \"BLUE PILL\" 180 Tab 0    fexofenadine (ALLEGRA) 180 mg tablet Take  by mouth.  cyanocobalamin, vitamin B-12, 1,000 mcg lozg 1 Tab by SubLINGual route daily.  acetaminophen (TYLENOL) 325 mg tablet Take 2 Tabs by mouth two (2) times daily as needed for Pain.  cetirizine (ZYRTEC) 10 mg tablet Take 10 mg by mouth daily as needed.  camphor-menthol (SARNA) 0.5-0.5 % lotion Apply  to affected area as needed for Itching. 222 mL 0    cholecalciferol, vitamin D3, (VITAMIN D3) 2,000 unit tab Take 1 Tab by mouth daily.        Allergies   Allergen Reactions    Augmentin [Amoxicillin-Pot Clavulanate] Other (comments)     \"sick\"    Sulfa (Sulfonamide Antibiotics) Hives and Rash    Zithromax [Azithromycin] Unknown (comments)     \"didn't work\"     Past Medical History:   Diagnosis Date    Arthritis 1980    Asthma 1996    Autoimmune disease ([facility] 2001    Carpal tunnel syndrome 4/13/2009    Chronic pain     GERD (gastroesophageal reflux disease) 4/13/2009    Hyperlipemia 4/13/2009    Hypertension 4/13/2009    MS (multiple sclerosis) ([facility] 1/1/2001    Osteopenia 4/13/2009    Vertigo      Past Surgical History:   Procedure Laterality Date    HX HYSTERECTOMY      HX LUMBAR DISKECTOMY      HX ORTHOPAEDIC  1976    DC REMOVAL OF OVARIAN CYST(S)       Family History   Problem Relation Age of Onset    Hypertension Mother     High Cholesterol Mother     Cancer Father         prostate    Asthma Father     High Cholesterol Sister     Cancer Brother         prostate    High Cholesterol Brother     High Cholesterol Sister     Dementia Sister      Social History     Tobacco Use    Smoking status: Never Smoker    Smokeless tobacco: Never Used   Substance Use Topics    Alcohol use: Never     Comment: rarely - occ       ROS - per HPI      Objective: Patient-Reported Vitals 6/1/2021   Patient-Reported Temperature 100.3     General: alert, cooperative, no distress   Mental  status: normal mood, behavior, speech, dress, motor activity, and thought processes, able to follow commands   Eyes: EOM intact, normal sclera   Mouth: not examined   Neck: no visualized mass   Resp: normal effort and no respiratory distress   Neuro: no gross deficits   Musculoskeletal: normal ROM of neck   Skin: no discoloration or lesions of concern on visible areas   Psychiatric: normal affect, no hallucinations     Fredrica Hunger, was evaluated through a synchronous (real-time) audio-video encounter. The patient (or guardian if applicable) is aware that this is a billable service. Verbal consent to proceed has been obtained within the past 12 months. The visit was conducted pursuant to the emergency declaration under the [address] [place] [address] authority and the [employer] General Act. Patient identification was verified, and a caregiver was present when appropriate. The patient was located in a state where the provider was credentialed to provide care.      Rox Smith MD

## 2021-06-11 ENCOUNTER — PATIENT MESSAGE (OUTPATIENT)
Dept: INTERNAL MEDICINE CLINIC | Age: 76
End: 2021-06-11

## 2021-06-11 DIAGNOSIS — G35 MS (MULTIPLE SCLEROSIS) (HCC): ICD-10-CM

## 2021-06-11 DIAGNOSIS — G47.10 HYPERSOMNOLENCE: ICD-10-CM

## 2021-06-11 RX ORDER — GABAPENTIN 400 MG/1
CAPSULE ORAL
Qty: 270 CAPSULE | Refills: 0 | Status: SHIPPED | OUTPATIENT
Start: 2021-06-11 | End: 2021-10-05

## 2021-06-11 RX ORDER — DEXTROAMPHETAMINE SACCHARATE, AMPHETAMINE ASPARTATE, DEXTROAMPHETAMINE SULFATE AND AMPHETAMINE SULFATE 2.5; 2.5; 2.5; 2.5 MG/1; MG/1; MG/1; MG/1
10 TABLET ORAL 2 TIMES DAILY
Qty: 180 TABLET | Refills: 0 | Status: SHIPPED | OUTPATIENT
Start: 2021-06-11 | End: 2022-02-09 | Stop reason: SDUPTHER

## 2021-08-12 ENCOUNTER — HOSPITAL ENCOUNTER (OUTPATIENT)
Dept: NON INVASIVE DIAGNOSTICS | Age: 76
Discharge: HOME | End: 2021-08-12
Payer: MEDICARE

## 2021-08-12 ENCOUNTER — TRANSCRIBE ORDER (OUTPATIENT)
Dept: REGISTRATION | Age: 76
End: 2021-08-12

## 2021-08-12 DIAGNOSIS — Z41.9 ELECTIVE SURGERY: Primary | ICD-10-CM

## 2021-08-12 DIAGNOSIS — Z41.9 ELECTIVE SURGERY: ICD-10-CM

## 2021-08-12 PROCEDURE — 93005 ELECTROCARDIOGRAM TRACING: CPT

## 2021-08-13 LAB
ATRIAL RATE: 64 BPM
CALCULATED P AXIS, ECG09: 6 DEGREES
CALCULATED R AXIS, ECG10: 19 DEGREES
CALCULATED T AXIS, ECG11: 25 DEGREES
DIAGNOSIS, 93000: NORMAL
P-R INTERVAL, ECG05: 146 MS
Q-T INTERVAL, ECG07: 394 MS
QRS DURATION, ECG06: 80 MS
QTC CALCULATION (BEZET), ECG08: 406 MS
VENTRICULAR RATE, ECG03: 64 BPM

## 2021-09-23 RX ORDER — ESCITALOPRAM OXALATE 10 MG/1
TABLET ORAL
Qty: 90 TABLET | Refills: 1 | Status: SHIPPED | OUTPATIENT
Start: 2021-09-23 | End: 2021-10-01

## 2021-10-01 ENCOUNTER — OFFICE VISIT (OUTPATIENT)
Dept: INTERNAL MEDICINE CLINIC | Age: 76
End: 2021-10-01
Payer: MEDICARE

## 2021-10-01 VITALS
TEMPERATURE: 97.8 F | OXYGEN SATURATION: 95 % | SYSTOLIC BLOOD PRESSURE: 144 MMHG | HEART RATE: 79 BPM | DIASTOLIC BLOOD PRESSURE: 80 MMHG | RESPIRATION RATE: 16 BRPM

## 2021-10-01 DIAGNOSIS — Z23 NEEDS FLU SHOT: Primary | ICD-10-CM

## 2021-10-01 DIAGNOSIS — E78.2 MIXED HYPERLIPIDEMIA: ICD-10-CM

## 2021-10-01 DIAGNOSIS — G47.10 HYPERSOMNOLENCE: ICD-10-CM

## 2021-10-01 DIAGNOSIS — G35 MS (MULTIPLE SCLEROSIS) (HCC): ICD-10-CM

## 2021-10-01 DIAGNOSIS — I10 ESSENTIAL HYPERTENSION: ICD-10-CM

## 2021-10-01 DIAGNOSIS — M79.2 NEUROPATHIC PAIN: ICD-10-CM

## 2021-10-01 LAB
ALBUMIN SERPL-MCNC: 4 G/DL (ref 3.5–5)
ALBUMIN/GLOB SERPL: 1.3 {RATIO} (ref 1.1–2.2)
ALP SERPL-CCNC: 78 U/L (ref 45–117)
ALT SERPL-CCNC: 44 U/L (ref 12–78)
ANION GAP SERPL CALC-SCNC: 4 MMOL/L (ref 5–15)
AST SERPL-CCNC: 31 U/L (ref 15–37)
BASOPHILS # BLD: 0 K/UL (ref 0–0.1)
BASOPHILS NFR BLD: 0 % (ref 0–1)
BILIRUB SERPL-MCNC: 0.7 MG/DL (ref 0.2–1)
BUN SERPL-MCNC: 19 MG/DL (ref 6–20)
BUN/CREAT SERPL: 25 (ref 12–20)
CALCIUM SERPL-MCNC: 10 MG/DL (ref 8.5–10.1)
CHLORIDE SERPL-SCNC: 107 MMOL/L (ref 97–108)
CHOLEST SERPL-MCNC: 248 MG/DL
CO2 SERPL-SCNC: 26 MMOL/L (ref 21–32)
CREAT SERPL-MCNC: 0.77 MG/DL (ref 0.55–1.02)
DIFFERENTIAL METHOD BLD: NORMAL
EOSINOPHIL # BLD: 0.1 K/UL (ref 0–0.4)
EOSINOPHIL NFR BLD: 1 % (ref 0–7)
ERYTHROCYTE [DISTWIDTH] IN BLOOD BY AUTOMATED COUNT: 12.8 % (ref 11.5–14.5)
GLOBULIN SER CALC-MCNC: 3.1 G/DL (ref 2–4)
GLUCOSE SERPL-MCNC: 96 MG/DL (ref 65–100)
HCT VFR BLD AUTO: 37 % (ref 35–47)
HDLC SERPL-MCNC: 69 MG/DL
HDLC SERPL: 3.6 {RATIO} (ref 0–5)
HGB BLD-MCNC: 12.8 G/DL (ref 11.5–16)
IMM GRANULOCYTES # BLD AUTO: 0 K/UL (ref 0–0.04)
IMM GRANULOCYTES NFR BLD AUTO: 0 % (ref 0–0.5)
LDLC SERPL CALC-MCNC: 155.4 MG/DL (ref 0–100)
LYMPHOCYTES # BLD: 1.7 K/UL (ref 0.8–3.5)
LYMPHOCYTES NFR BLD: 24 % (ref 12–49)
MCH RBC QN AUTO: 29.8 PG (ref 26–34)
MCHC RBC AUTO-ENTMCNC: 34.6 G/DL (ref 30–36.5)
MCV RBC AUTO: 86.2 FL (ref 80–99)
MONOCYTES # BLD: 0.6 K/UL (ref 0–1)
MONOCYTES NFR BLD: 9 % (ref 5–13)
NEUTS SEG # BLD: 4.6 K/UL (ref 1.8–8)
NEUTS SEG NFR BLD: 66 % (ref 32–75)
NRBC # BLD: 0 K/UL (ref 0–0.01)
NRBC BLD-RTO: 0 PER 100 WBC
PLATELET # BLD AUTO: 269 K/UL (ref 150–400)
PMV BLD AUTO: 9.9 FL (ref 8.9–12.9)
POTASSIUM SERPL-SCNC: 4.4 MMOL/L (ref 3.5–5.1)
PROT SERPL-MCNC: 7.1 G/DL (ref 6.4–8.2)
RBC # BLD AUTO: 4.29 M/UL (ref 3.8–5.2)
SODIUM SERPL-SCNC: 137 MMOL/L (ref 136–145)
TRIGL SERPL-MCNC: 118 MG/DL (ref ?–150)
TSH SERPL DL<=0.05 MIU/L-ACNC: 1.76 UIU/ML (ref 0.36–3.74)
VLDLC SERPL CALC-MCNC: 23.6 MG/DL
WBC # BLD AUTO: 7 K/UL (ref 3.6–11)

## 2021-10-01 PROCEDURE — G8427 DOCREV CUR MEDS BY ELIG CLIN: HCPCS | Performed by: INTERNAL MEDICINE

## 2021-10-01 PROCEDURE — G8510 SCR DEP NEG, NO PLAN REQD: HCPCS | Performed by: INTERNAL MEDICINE

## 2021-10-01 PROCEDURE — 90694 VACC AIIV4 NO PRSRV 0.5ML IM: CPT | Performed by: INTERNAL MEDICINE

## 2021-10-01 PROCEDURE — 1101F PT FALLS ASSESS-DOCD LE1/YR: CPT | Performed by: INTERNAL MEDICINE

## 2021-10-01 PROCEDURE — G8421 BMI NOT CALCULATED: HCPCS | Performed by: INTERNAL MEDICINE

## 2021-10-01 PROCEDURE — 3017F COLORECTAL CA SCREEN DOC REV: CPT | Performed by: INTERNAL MEDICINE

## 2021-10-01 PROCEDURE — G8536 NO DOC ELDER MAL SCRN: HCPCS | Performed by: INTERNAL MEDICINE

## 2021-10-01 PROCEDURE — 99214 OFFICE O/P EST MOD 30 MIN: CPT | Performed by: INTERNAL MEDICINE

## 2021-10-01 PROCEDURE — G8754 DIAS BP LESS 90: HCPCS | Performed by: INTERNAL MEDICINE

## 2021-10-01 PROCEDURE — G8399 PT W/DXA RESULTS DOCUMENT: HCPCS | Performed by: INTERNAL MEDICINE

## 2021-10-01 PROCEDURE — G8753 SYS BP > OR = 140: HCPCS | Performed by: INTERNAL MEDICINE

## 2021-10-01 PROCEDURE — 1090F PRES/ABSN URINE INCON ASSESS: CPT | Performed by: INTERNAL MEDICINE

## 2021-10-01 PROCEDURE — G0463 HOSPITAL OUTPT CLINIC VISIT: HCPCS | Performed by: INTERNAL MEDICINE

## 2021-10-01 RX ORDER — ESCITALOPRAM OXALATE 10 MG/1
5 TABLET ORAL DAILY
Qty: 90 TABLET | Refills: 1 | COMMUNITY
Start: 2021-10-01 | End: 2022-08-05 | Stop reason: ALTCHOICE

## 2021-10-01 RX ORDER — LOSARTAN POTASSIUM 50 MG/1
100 TABLET ORAL DAILY
Qty: 90 TABLET | Refills: 3 | COMMUNITY
Start: 2021-10-01 | End: 2022-04-07 | Stop reason: ALTCHOICE

## 2021-10-01 NOTE — PATIENT INSTRUCTIONS
Vaccine Information Statement    Influenza (Flu) Vaccine (Inactivated or Recombinant): What You Need to Know    Many vaccine information statements are available in Romanian and other languages. See www.immunize.org/vis. Hojas de información sobre vacunas están disponibles en español y en muchos otros idiomas. Visite www.immunize.org/vis. 1. Why get vaccinated? Influenza vaccine can prevent influenza (flu). Flu is a contagious disease that spreads around the [facility] every year, usually between October and May. Anyone can get the flu, but it is more dangerous for some people. Infants and young children, people 72 years and older, pregnant people, and people with certain health conditions or a weakened immune system are at greatest risk of flu complications. Pneumonia, bronchitis, sinus infections, and ear infections are examples of flu-related complications. If you have a medical condition, such as heart disease, cancer, or diabetes, flu can make it worse. Flu can cause fever and chills, sore throat, muscle aches, fatigue, cough, headache, and runny or stuffy nose. Some people may have vomiting and diarrhea, though this is more common in children than adults. In an average year, thousands of people in the [facility] die from flu, and many more are hospitalized. Flu vaccine prevents millions of illnesses and flu-related visits to the doctor each year. 2. Influenza vaccines     CDC recommends everyone 6 months and older get vaccinated every flu season. Children 6 months through 6years of age may need 2 doses during a single flu season. Everyone else needs only 1 dose each flu season. It takes about 2 weeks for protection to develop after vaccination. There are many flu viruses, and they are always changing. Each year a new flu vaccine is made to protect against the influenza viruses believed to be likely to cause disease in the upcoming flu season.  Even when the vaccine doesnt exactly match these viruses, it may still provide some protection. Influenza vaccine does not cause flu. Influenza vaccine may be given at the same time as other vaccines. 3. Talk with your health care provider    Tell your vaccination provider if the person getting the vaccine:   Has had an allergic reaction after a previous dose of influenza vaccine, or has any severe, life-threatening allergies    Has ever had Guillain-Barré Syndrome (also called GBS)    In some cases, your health care provider may decide to postpone influenza vaccination until a future visit. Influenza vaccine can be administered at any time during pregnancy. People who are or will be pregnant during influenza season should receive inactivated influenza vaccine. People with minor illnesses, such as a cold, may be vaccinated. People who are moderately or severely ill should usually wait until they recover before getting influenza vaccine. Your health care provider can give you more information. 4. Risks of a vaccine reaction     Soreness, redness, and swelling where the shot is given, fever, muscle aches, and headache can happen after influenza vaccination.  There may be a very small increased risk of Guillain-Barré Syndrome (GBS) after inactivated influenza vaccine (the flu shot). Andreas Mora children who get the flu shot along with pneumococcal vaccine (PCV13) and/or DTaP vaccine at the same time might be slightly more likely to have a seizure caused by fever. Tell your health care provider if a child who is getting flu vaccine has ever had a seizure. People sometimes faint after medical procedures, including vaccination. Tell your provider if you feel dizzy or have vision changes or ringing in the ears. As with any medicine, there is a very remote chance of a vaccine causing a severe allergic reaction, other serious injury, or death. 5. What if there is a serious problem?     An allergic reaction could occur after the vaccinated person leaves the clinic. If you see signs of a severe allergic reaction (hives, swelling of the face and throat, difficulty breathing, a fast heartbeat, dizziness, or weakness), call 9-1-1 and get the person to the nearest hospital.    For other signs that concern you, call your health care provider. Adverse reactions should be reported to the Vaccine Adverse Event Reporting System (VAERS). Your health care provider will usually file this report, or you can do it yourself. Visit the VAERS website at www.vaers. [facility].gov or call 1-812.675.7917. VAERS is only for reporting reactions, and VAERS staff members do not give medical advice. 6. The National Vaccine Injury Compensation Program    The [facility] Vaccine Injury Compensation Program (VICP) is a federal program that was created to compensate people who may have been injured by certain vaccines. Claims regarding alleged injury or death due to vaccination have a time limit for filing, which may be as short as two years. Visit the VICP website at www.[facility].gov/vaccinecompensation or call 2-475.154.2889 to learn about the program and about filing a claim. 7. How can I learn more?  Ask your health care provider.  Call your local or state health department.  Visit the website of the Food and Drug Administration (FDA) for vaccine package inserts and additional information at www.fda.gov/vaccines-blood-biologics/vaccines.  Contact the Centers for Disease Control and Prevention (CDC):  - Call 6-890.326.8824 (1-800-CDC-INFO) or  - Visit CDCs influenza website at www.cdc.gov/flu. Vaccine Information Statement   Inactivated Influenza Vaccine   8/6/2021  [id number]   Department of Health and Human Services  Centers for Disease Control and Prevention    Office Use Only

## 2021-10-01 NOTE — PROGRESS NOTES
HPI:  Ruperto Maloney is a 77y.o. year old female who is here for a follow-up visit. She has had several weeks history of pain across the back of her neck. It seems to only occur when she is standing and makes her feel like she is going to fall down. She has had no falls of significance. No head trauma. Does have some chronic headaches and lightheadedness. No nosebleeds. Some chronic nasal congestion and pressure. No nausea or vomiting. No change in bowel or bladder habits. She wants to stop her Lexapro as she feels that it is making her nauseous and jittery. Her Adderall does seem to be helping. She does have issues with sleep at times. Past Medical History:   Diagnosis Date    Arthritis 1980    Asthma 1996    Autoimmune disease ([facility] [address] 2001    Carpal tunnel syndrome 4/13/2009    Chronic pain     GERD (gastroesophageal reflux disease) 4/13/2009    Hyperlipemia 4/13/2009    Hypertension 4/13/2009    MS (multiple sclerosis) ([facility] [address] 1/1/2001    Osteopenia 4/13/2009    Vertigo        Past Surgical History:   Procedure Laterality Date    HX HYSTERECTOMY      HX LUMBAR DISKECTOMY      HX ORTHOPAEDIC  1976    NM REMOVAL OF OVARIAN CYST(S)         Prior to Admission medications    Medication Sig Start Date End Date Taking? Authorizing Provider   escitalopram oxalate (LEXAPRO) 10 mg tablet Take 0.5 Tablets by mouth daily. 10/1/21  Yes Catalina Diana MD   losartan (COZAAR) 50 mg tablet Take 2 Tablets by mouth daily. 10/1/21  Yes Catalina Diana MD   gabapentin (NEURONTIN) 400 mg capsule TAKE 1 CAPSULE 3 TIMES A   DAY 6/11/21  Yes Catalina Diana MD   dextroamphetamine-amphetamine (ADDERALL) 10 mg tablet Take 1 Tablet by mouth two (2) times a day. Max Daily Amount: 20 mg. PT REQUEST THE \"BLUE PILL\" 6/11/21  Yes Catalina Diana MD   simvastatin (ZOCOR) 40 mg tablet Take 1 Tab by mouth nightly.  3/24/21  Yes Catalina Diana MD   fluticasone propionate (FLONASE) 50 mcg/actuation nasal spray 2 Sprays by Both Nostrils route two (2) times daily as needed for Rhinitis. 1/19/21  Yes Ping Wooten MD   famotidine (PEPCID) 20 mg tablet Take 1 Tab by mouth two (2) times a day. 1/19/21  Yes Ping Wooten MD   fexofenadine (ALLEGRA) 180 mg tablet Take  by mouth. Yes Provider, Historical   cyanocobalamin, vitamin B-12, 1,000 mcg lozg 1 Tab by SubLINGual route daily. Yes Provider, Historical   acetaminophen (TYLENOL) 325 mg tablet Take 2 Tabs by mouth two (2) times daily as needed for Pain. 8/21/19  Yes Ping Wooten MD   camphor-menthol [facility]IS[employer] [facility]) 0.5-0.5 % lotion Apply  to affected area as needed for Itching. 1/14/16  Yes Ping Wooten MD   cholecalciferol, vitamin D3, (VITAMIN D3) 2,000 unit tab Take 1 Tab by mouth daily. Yes Provider, Historical   escitalopram oxalate (LEXAPRO) 10 mg tablet TAKE 1 TABLET DAILY 9/23/21 10/1/21  Kira Bah III, MD   losartan (COZAAR) 50 mg tablet TAKE 1 TABLET DAILY 1/27/21 10/1/21  Kira Bah III, MD   cetirizine (ZYRTEC) 10 mg tablet Take 10 mg by mouth daily as needed.   Patient not taking: Reported on 10/1/2021 10/29/18   Ping Wooten MD       Social History     Socioeconomic History    Marital status:      Spouse name: Not on file    Number of children: Not on file    Years of education: Not on file    Highest education level: Not on file   Occupational History    Not on file   Tobacco Use    Smoking status: Never Smoker    Smokeless tobacco: Never Used   Vaping Use    Vaping Use: Never used   Substance and Sexual Activity    Alcohol use: Never     Comment: rarely - occ    Drug use: No    Sexual activity: Not Currently     Partners: Male   Other Topics Concern    Not on file   Social History Narrative    Not on file     Social Determinants of Health     Financial Resource Strain:     Difficulty of Paying Living Expenses:    Food Insecurity:     Worried About Running Out of [employer] in the Last Year:    [address] in the Last Year:    Transportation Needs:     Lack of Transportation (Medical):  Lack of Transportation (Non-Medical):    Physical Activity:     Days of Exercise per Week:     Minutes of Exercise per Session:    Stress:     Feeling of Stress :    Social Connections:     Frequency of Communication with Friends and Family:     Frequency of Social Gatherings with Friends and Family:     Attends Jainism Services:     Active Member of Clubs or Organizations:     Attends Club or Organization Meetings:     Marital Status:    Intimate Partner Violence:     Fear of Current or Ex-Partner:     Emotionally Abused:     Physically Abused:     Sexually Abused:           ROS  Per HPI    Visit Vitals  BP (!) 144/80   Pulse 79   Temp 97.8 °F (36.6 °C) (Temporal)   Resp 16   SpO2 95%         Physical Exam   Physical Examination: General appearance - alert, well appearing, and in no distress  Mouth - mucous membranes moist, pharynx normal without lesions  Neck - supple, no significant adenopathy  Lymphatics - no palpable lymphadenopathy, no hepatosplenomegaly  Chest - clear to auscultation, no wheezes, rales or rhonchi, symmetric air entry  Heart - normal rate, regular rhythm, normal S1, S2, no murmurs, rubs, clicks or gallops  Abdomen - soft, nontender, nondistended, no masses or organomegaly  Neurological - abnormal neurological exam unchanged from prior examinations  Musculoskeletal - no joint tenderness, deformity or swelling  Extremities - peripheral pulses normal, no pedal edema, no clubbing or cyanosis  There is tenderness across the trapezius and at the occiput bilaterally. Negative Spurling's maneuver in the neck. Assessment/Plan:  Diagnoses and all orders for this visit:    1.  Needs flu shot  -     FLU (FLUAD QUAD INFLUENZA VACCINE,QUAD,ADJUVANTED)    2. [facility] discussed seeing another neurologist to consider alternative treatments and she will consider. She was not happy with the neurologist at [facility]. 3. Hypersomnolence-continue Adderall. 4. Essential hypertension-not well controlled. Increase losartan to 100 mg a day and follow blood pressures.  -     TSH 3RD GENERATION; Future  -     CBC WITH AUTOMATED DIFF; Future  -     METABOLIC PANEL, COMPREHENSIVE; Future    5. Mixed hyperlipidemia-LDL goal of 100. Check labs today. -     LIPID PANEL; Future    6. Neuropathic pain-continue gabapentin. The neck pain may be due to this and if it persist, may increase gabapentin.  -     TSH 3RD GENERATION; Future  7. Anxiety-appears controlled. Will reduce Lexapro to 5 mg and see how her symptoms go. She will report how she is doing in a month. Follow-up and Dispositions    · Return in about 1 month (around 11/1/2021). Advised her to call back or return to office if symptoms worsen/change/persist.  Discussed expected course/resolution/complications of diagnosis in detail with patient. Medication risks/benefits/costs/interactions/alternatives discussed with patient. She was given an after visit summary which includes diagnoses, current medications, & vitals. She expressed understanding with the diagnosis and plan.

## 2021-10-01 NOTE — PROGRESS NOTES
Margarita Blandon  is a 77 y.o.  female  who present for routine immunizations. Prior to vaccine administration: Consent was obtained. Risks and adverse reactions were discussed. The patient was provided the VIS and they were given an opportunity to ask questions; all questions were addressed. She  denies any symptoms, reactions or allergies that would exclude them from being immunized today. There were no adverse reactions observed post vaccination. Patient was advised to seek medical or call the office with any questions or concerns post vaccination. Patient verbalized understanding.   Felicitas Saucedo LPN

## 2021-10-04 ENCOUNTER — TELEPHONE (OUTPATIENT)
Dept: INTERNAL MEDICINE CLINIC | Age: 76
End: 2021-10-04

## 2021-10-04 DIAGNOSIS — E78.2 MIXED HYPERLIPIDEMIA: Primary | ICD-10-CM

## 2021-10-04 DIAGNOSIS — G35 MS (MULTIPLE SCLEROSIS) (HCC): ICD-10-CM

## 2021-10-04 RX ORDER — ROSUVASTATIN CALCIUM 20 MG/1
20 TABLET, COATED ORAL
Qty: 90 TABLET | Refills: 1 | Status: SHIPPED | OUTPATIENT
Start: 2021-10-04

## 2021-10-04 NOTE — TELEPHONE ENCOUNTER
----- Message from Alexx Frankel MD sent at 10/1/2021  8:58 PM EDT -----  Change the zocor to crestor 20 mg daily. Labs in 6 months.

## 2021-10-04 NOTE — TELEPHONE ENCOUNTER
Chief Complaint   Patient presents with    Results     Attempted to contact patient, unsuccessful.    -Left message to return call.

## 2021-10-04 NOTE — TELEPHONE ENCOUNTER
Spoke with patient. Advised per Dr. Kyle Brownlee cholesterol  levels not at goal.  Need to discontinue zocor . Change to Crestor 20 mg once daily. New rx sent to local pharmacy per patients request.  Repeat labs in 6 months. Patient verbalized understanding. Orders Placed This Encounter    rosuvastatin (CRESTOR) 20 mg tablet     Sig: Take 1 Tablet by mouth nightly. Dispense:  90 Tablet     Refill:  1     The above orders were approved via [facility] per Dr. Ana Nelson, III.

## 2021-10-05 RX ORDER — GABAPENTIN 400 MG/1
CAPSULE ORAL
Qty: 270 CAPSULE | Refills: 0 | Status: SHIPPED | OUTPATIENT
Start: 2021-10-05 | End: 2022-03-01

## 2022-01-18 ENCOUNTER — PATIENT MESSAGE (OUTPATIENT)
Dept: INTERNAL MEDICINE CLINIC | Age: 77
End: 2022-01-18

## 2022-01-18 DIAGNOSIS — G35 MS (MULTIPLE SCLEROSIS) (HCC): Primary | ICD-10-CM

## 2022-02-09 ENCOUNTER — PATIENT MESSAGE (OUTPATIENT)
Dept: INTERNAL MEDICINE CLINIC | Age: 77
End: 2022-02-09

## 2022-02-09 DIAGNOSIS — G35 MS (MULTIPLE SCLEROSIS) (HCC): ICD-10-CM

## 2022-02-09 DIAGNOSIS — G47.10 HYPERSOMNOLENCE: ICD-10-CM

## 2022-02-09 RX ORDER — DEXTROAMPHETAMINE SACCHARATE, AMPHETAMINE ASPARTATE, DEXTROAMPHETAMINE SULFATE AND AMPHETAMINE SULFATE 2.5; 2.5; 2.5; 2.5 MG/1; MG/1; MG/1; MG/1
10 TABLET ORAL 2 TIMES DAILY
Qty: 180 TABLET | Refills: 0 | Status: SHIPPED | OUTPATIENT
Start: 2022-02-09 | End: 2022-08-05 | Stop reason: SDUPTHER

## 2022-02-28 DIAGNOSIS — G35 MS (MULTIPLE SCLEROSIS) (HCC): ICD-10-CM

## 2022-03-01 RX ORDER — GABAPENTIN 400 MG/1
CAPSULE ORAL
Qty: 270 CAPSULE | Refills: 0 | Status: SHIPPED | OUTPATIENT
Start: 2022-03-01 | End: 2022-07-12

## 2022-03-11 ENCOUNTER — PATIENT MESSAGE (OUTPATIENT)
Dept: INTERNAL MEDICINE CLINIC | Age: 77
End: 2022-03-11

## 2022-03-11 RX ORDER — SCOLOPAMINE TRANSDERMAL SYSTEM 1 MG/1
1 PATCH, EXTENDED RELEASE TRANSDERMAL
Qty: 3 PATCH | Refills: 0 | Status: SHIPPED | OUTPATIENT
Start: 2022-03-11 | End: 2022-08-05 | Stop reason: SDUPTHER

## 2022-03-18 PROBLEM — G47.33 OSA (OBSTRUCTIVE SLEEP APNEA): Status: ACTIVE | Noted: 2018-10-29

## 2022-03-19 PROBLEM — R53.83 FATIGUE DUE TO SLEEP PATTERN DISTURBANCE: Status: ACTIVE | Noted: 2018-10-29

## 2022-03-19 PROBLEM — G47.9 FATIGUE DUE TO SLEEP PATTERN DISTURBANCE: Status: ACTIVE | Noted: 2018-10-29

## 2022-03-24 ENCOUNTER — PATIENT MESSAGE (OUTPATIENT)
Dept: INTERNAL MEDICINE CLINIC | Age: 77
End: 2022-03-24

## 2022-03-25 RX ORDER — FLUTICASONE PROPIONATE 50 MCG
2 SPRAY, SUSPENSION (ML) NASAL
Qty: 3 EACH | Refills: 3 | Status: SHIPPED | OUTPATIENT
Start: 2022-03-25

## 2022-04-07 RX ORDER — VALSARTAN 160 MG/1
160 TABLET ORAL DAILY
Qty: 90 TABLET | Refills: 1 | Status: SHIPPED | OUTPATIENT
Start: 2022-04-07 | End: 2022-07-22

## 2022-05-31 ENCOUNTER — E-VISIT (OUTPATIENT)
Dept: INTERNAL MEDICINE CLINIC | Age: 77
End: 2022-05-31
Payer: MEDICARE

## 2022-05-31 DIAGNOSIS — J01.00 ACUTE NON-RECURRENT MAXILLARY SINUSITIS: Primary | ICD-10-CM

## 2022-05-31 PROCEDURE — 99421 OL DIG E/M SVC 5-10 MIN: CPT | Performed by: INTERNAL MEDICINE

## 2022-05-31 RX ORDER — DOXYCYCLINE 100 MG/1
100 TABLET ORAL 2 TIMES DAILY
Qty: 20 TABLET | Refills: 0 | Status: SHIPPED | OUTPATIENT
Start: 2022-05-31 | End: 2022-08-05 | Stop reason: ALTCHOICE

## 2022-06-01 NOTE — PROGRESS NOTES
E-Visit Note:    HPI: per patient questionnaire, this has been reviewed  EXAM: n/a    ----------------------------------  Diagnoses and all orders for this visit:    1. Acute non-recurrent maxillary sinusitis    Other orders  -     doxycycline (ADOXA) 100 mg tablet; Take 1 Tablet by mouth two (2) times a day. PLAN:   Flonase as well as antibiotics and let me know if not getting better. ----------------------------------  The patient was advised to call if these symptoms worsen or fail to improve as anticipated. 5-10 minutes were spent on the digital evaluation and management of this patient.        Christelle Rivera MD

## 2022-07-11 DIAGNOSIS — G35 MS (MULTIPLE SCLEROSIS) (HCC): ICD-10-CM

## 2022-07-12 RX ORDER — GABAPENTIN 400 MG/1
CAPSULE ORAL
Qty: 270 CAPSULE | Refills: 0 | Status: SHIPPED | OUTPATIENT
Start: 2022-07-12 | End: 2022-10-07 | Stop reason: SDUPTHER

## 2022-07-22 ENCOUNTER — TELEPHONE (OUTPATIENT)
Dept: INTERNAL MEDICINE CLINIC | Age: 77
End: 2022-07-22

## 2022-07-22 DIAGNOSIS — I10 ESSENTIAL HYPERTENSION: Primary | ICD-10-CM

## 2022-07-22 RX ORDER — VALSARTAN 80 MG/1
80 TABLET ORAL 2 TIMES DAILY
Qty: 180 TABLET | Refills: 0 | Status: SHIPPED | OUTPATIENT
Start: 2022-07-22

## 2022-07-22 NOTE — TELEPHONE ENCOUNTER
----- Message from Leonila Clifford. Nick Flynn sent at 7/22/2022 12:27 PM EDT -----  Regarding: HBP med  [facility] [employer]! I did not get this message until today. Jaja Gallardo sent me a new IPhone on the 9th of July and your message was not received during the week of 12th. I was contacting you to let you know that I have been out of med for several days. Can you send the script to [phone] mail delivery?   Thank you, Alhaji Moran

## 2022-08-05 ENCOUNTER — OFFICE VISIT (OUTPATIENT)
Dept: INTERNAL MEDICINE CLINIC | Age: 77
End: 2022-08-05
Payer: MEDICARE

## 2022-08-05 VITALS
DIASTOLIC BLOOD PRESSURE: 61 MMHG | WEIGHT: 143 LBS | TEMPERATURE: 97.8 F | OXYGEN SATURATION: 96 % | HEART RATE: 79 BPM | SYSTOLIC BLOOD PRESSURE: 132 MMHG | HEIGHT: 62 IN | RESPIRATION RATE: 14 BRPM | BODY MASS INDEX: 26.31 KG/M2

## 2022-08-05 DIAGNOSIS — G35 MS (MULTIPLE SCLEROSIS) (HCC): ICD-10-CM

## 2022-08-05 DIAGNOSIS — E78.2 MIXED HYPERLIPIDEMIA: ICD-10-CM

## 2022-08-05 DIAGNOSIS — G56.03 BILATERAL CARPAL TUNNEL SYNDROME: ICD-10-CM

## 2022-08-05 DIAGNOSIS — Z00.00 MEDICARE ANNUAL WELLNESS VISIT, SUBSEQUENT: Primary | ICD-10-CM

## 2022-08-05 DIAGNOSIS — G35 MULTIPLE SCLEROSIS (HCC): ICD-10-CM

## 2022-08-05 DIAGNOSIS — M79.2 NEUROPATHIC PAIN: ICD-10-CM

## 2022-08-05 DIAGNOSIS — I10 PRIMARY HYPERTENSION: ICD-10-CM

## 2022-08-05 DIAGNOSIS — K21.9 GASTROESOPHAGEAL REFLUX DISEASE WITHOUT ESOPHAGITIS: ICD-10-CM

## 2022-08-05 DIAGNOSIS — R32 URINARY INCONTINENCE, UNSPECIFIED TYPE: ICD-10-CM

## 2022-08-05 DIAGNOSIS — G47.10 HYPERSOMNOLENCE: ICD-10-CM

## 2022-08-05 DIAGNOSIS — R53.83 FATIGUE DUE TO SLEEP PATTERN DISTURBANCE: ICD-10-CM

## 2022-08-05 DIAGNOSIS — N32.81 OAB (OVERACTIVE BLADDER): ICD-10-CM

## 2022-08-05 DIAGNOSIS — G47.9 FATIGUE DUE TO SLEEP PATTERN DISTURBANCE: ICD-10-CM

## 2022-08-05 DIAGNOSIS — G47.33 OSA (OBSTRUCTIVE SLEEP APNEA): ICD-10-CM

## 2022-08-05 LAB
ALBUMIN SERPL-MCNC: 4.1 G/DL (ref 3.5–5)
ALBUMIN/GLOB SERPL: 1.4 {RATIO} (ref 1.1–2.2)
ALP SERPL-CCNC: 90 U/L (ref 45–117)
ALT SERPL-CCNC: 44 U/L (ref 12–78)
ANION GAP SERPL CALC-SCNC: 5 MMOL/L (ref 5–15)
AST SERPL-CCNC: 27 U/L (ref 15–37)
BASOPHILS # BLD: 0 K/UL (ref 0–0.1)
BASOPHILS NFR BLD: 1 % (ref 0–1)
BILIRUB SERPL-MCNC: 1.1 MG/DL (ref 0.2–1)
BUN SERPL-MCNC: 18 MG/DL (ref 6–20)
BUN/CREAT SERPL: 20 (ref 12–20)
CALCIUM SERPL-MCNC: 9.9 MG/DL (ref 8.5–10.1)
CHLORIDE SERPL-SCNC: 107 MMOL/L (ref 97–108)
CHOLEST SERPL-MCNC: 182 MG/DL
CO2 SERPL-SCNC: 28 MMOL/L (ref 21–32)
CREAT SERPL-MCNC: 0.91 MG/DL (ref 0.55–1.02)
DIFFERENTIAL METHOD BLD: NORMAL
EOSINOPHIL # BLD: 0.1 K/UL (ref 0–0.4)
EOSINOPHIL NFR BLD: 2 % (ref 0–7)
ERYTHROCYTE [DISTWIDTH] IN BLOOD BY AUTOMATED COUNT: 13.2 % (ref 11.5–14.5)
GLOBULIN SER CALC-MCNC: 3 G/DL (ref 2–4)
GLUCOSE SERPL-MCNC: 92 MG/DL (ref 65–100)
HCT VFR BLD AUTO: 41 % (ref 35–47)
HDLC SERPL-MCNC: 83 MG/DL
HDLC SERPL: 2.2 {RATIO} (ref 0–5)
HGB BLD-MCNC: 13.6 G/DL (ref 11.5–16)
IMM GRANULOCYTES # BLD AUTO: 0 K/UL (ref 0–0.04)
IMM GRANULOCYTES NFR BLD AUTO: 0 % (ref 0–0.5)
LDLC SERPL CALC-MCNC: 81.6 MG/DL (ref 0–100)
LYMPHOCYTES # BLD: 1.6 K/UL (ref 0.8–3.5)
LYMPHOCYTES NFR BLD: 25 % (ref 12–49)
MCH RBC QN AUTO: 29.4 PG (ref 26–34)
MCHC RBC AUTO-ENTMCNC: 33.2 G/DL (ref 30–36.5)
MCV RBC AUTO: 88.7 FL (ref 80–99)
MONOCYTES # BLD: 0.6 K/UL (ref 0–1)
MONOCYTES NFR BLD: 9 % (ref 5–13)
NEUTS SEG # BLD: 4 K/UL (ref 1.8–8)
NEUTS SEG NFR BLD: 63 % (ref 32–75)
NRBC # BLD: 0 K/UL (ref 0–0.01)
NRBC BLD-RTO: 0 PER 100 WBC
PLATELET # BLD AUTO: 257 K/UL (ref 150–400)
PMV BLD AUTO: 9.9 FL (ref 8.9–12.9)
POTASSIUM SERPL-SCNC: 4.3 MMOL/L (ref 3.5–5.1)
PROT SERPL-MCNC: 7.1 G/DL (ref 6.4–8.2)
RBC # BLD AUTO: 4.62 M/UL (ref 3.8–5.2)
SODIUM SERPL-SCNC: 140 MMOL/L (ref 136–145)
TRIGL SERPL-MCNC: 87 MG/DL (ref ?–150)
TSH SERPL DL<=0.05 MIU/L-ACNC: 1.8 UIU/ML (ref 0.36–3.74)
VLDLC SERPL CALC-MCNC: 17.4 MG/DL
WBC # BLD AUTO: 6.3 K/UL (ref 3.6–11)

## 2022-08-05 PROCEDURE — G8417 CALC BMI ABV UP PARAM F/U: HCPCS | Performed by: INTERNAL MEDICINE

## 2022-08-05 PROCEDURE — G8752 SYS BP LESS 140: HCPCS | Performed by: INTERNAL MEDICINE

## 2022-08-05 PROCEDURE — 99214 OFFICE O/P EST MOD 30 MIN: CPT | Performed by: INTERNAL MEDICINE

## 2022-08-05 PROCEDURE — G8399 PT W/DXA RESULTS DOCUMENT: HCPCS | Performed by: INTERNAL MEDICINE

## 2022-08-05 PROCEDURE — G0463 HOSPITAL OUTPT CLINIC VISIT: HCPCS | Performed by: INTERNAL MEDICINE

## 2022-08-05 PROCEDURE — G8510 SCR DEP NEG, NO PLAN REQD: HCPCS | Performed by: INTERNAL MEDICINE

## 2022-08-05 PROCEDURE — G8536 NO DOC ELDER MAL SCRN: HCPCS | Performed by: INTERNAL MEDICINE

## 2022-08-05 PROCEDURE — G8427 DOCREV CUR MEDS BY ELIG CLIN: HCPCS | Performed by: INTERNAL MEDICINE

## 2022-08-05 PROCEDURE — 1090F PRES/ABSN URINE INCON ASSESS: CPT | Performed by: INTERNAL MEDICINE

## 2022-08-05 PROCEDURE — G8754 DIAS BP LESS 90: HCPCS | Performed by: INTERNAL MEDICINE

## 2022-08-05 PROCEDURE — 1101F PT FALLS ASSESS-DOCD LE1/YR: CPT | Performed by: INTERNAL MEDICINE

## 2022-08-05 PROCEDURE — G0439 PPPS, SUBSEQ VISIT: HCPCS | Performed by: INTERNAL MEDICINE

## 2022-08-05 RX ORDER — TOLTERODINE 4 MG/1
4 CAPSULE, EXTENDED RELEASE ORAL DAILY
Qty: 30 CAPSULE | Refills: 3 | Status: SHIPPED | OUTPATIENT
Start: 2022-08-05

## 2022-08-05 RX ORDER — DULOXETIN HYDROCHLORIDE 30 MG/1
30 CAPSULE, DELAYED RELEASE ORAL DAILY
Qty: 30 CAPSULE | Refills: 0 | Status: SHIPPED | OUTPATIENT
Start: 2022-08-05 | End: 2022-08-05 | Stop reason: CLARIF

## 2022-08-05 RX ORDER — ZOSTER VACCINE RECOMBINANT, ADJUVANTED 50 MCG/0.5
0.5 KIT INTRAMUSCULAR ONCE
Qty: 0.5 ML | Refills: 1 | Status: SHIPPED | OUTPATIENT
Start: 2022-08-05 | End: 2022-08-05

## 2022-08-05 RX ORDER — DEXTROAMPHETAMINE SACCHARATE, AMPHETAMINE ASPARTATE, DEXTROAMPHETAMINE SULFATE AND AMPHETAMINE SULFATE 2.5; 2.5; 2.5; 2.5 MG/1; MG/1; MG/1; MG/1
10 TABLET ORAL 2 TIMES DAILY
Qty: 180 TABLET | Refills: 0 | Status: SHIPPED | OUTPATIENT
Start: 2022-08-05

## 2022-08-05 RX ORDER — DULOXETIN HYDROCHLORIDE 30 MG/1
30 CAPSULE, DELAYED RELEASE ORAL DAILY
Qty: 90 CAPSULE | Refills: 1 | Status: SHIPPED | OUTPATIENT
Start: 2022-08-05

## 2022-08-05 RX ORDER — SCOLOPAMINE TRANSDERMAL SYSTEM 1 MG/1
1 PATCH, EXTENDED RELEASE TRANSDERMAL
Qty: 3 PATCH | Refills: 0 | Status: SHIPPED | OUTPATIENT
Start: 2022-08-05 | End: 2022-10-07 | Stop reason: ALTCHOICE

## 2022-08-05 NOTE — PROGRESS NOTES
This is the Subsequent Medicare Annual Wellness Exam, performed 12 months or more after the Initial AWV or the last Subsequent AWV    I have reviewed the patient's medical history in detail and updated the computerized patient record. As well as a follow up visit. Has ongoing issues with neuropathic pain. Mostly in the legs and as well as in the hands. Some swelling mostly in her right foot. No unusual headaches but does have some occasional sharp pain in the head. No dizziness. No nosebleeds. No chest pains or shortness of breath. She stopped taking her Lexapro on her own. She is not sure whether the gabapentin is making much difference in the neuropathic pain symptoms. Some ongoing issues with urine incontinence and urgency. No dysuria or hematuria. ROS - Per HPI    Physical Examination: General appearance - alert, well appearing, and in no distress  Ears - bilateral TM's and external ear canals normal  Mouth - mucous membranes moist, pharynx normal without lesions  Neck - supple, no significant adenopathy  Lymphatics - no palpable lymphadenopathy, no hepatosplenomegaly  Chest - clear to auscultation, no wheezes, rales or rhonchi, symmetric air entry  Heart - normal rate, regular rhythm, normal S1, S2, no murmurs, rubs, clicks or gallops  Abdomen - soft, nontender, nondistended, no masses or organomegaly  Neurological - abnormal neurological exam unchanged from prior examinations  Musculoskeletal - no joint tenderness, deformity or swelling  Extremities - peripheral pulses normal, no pedal edema, no clubbing or cyanosis        Assessment/Plan   Education and counseling provided:  Are appropriate based on today's review and evaluation  Diabetes screening test    1. Medicare annual wellness visit, subsequent  2. MS (multiple sclerosis) (HCC) appears stable. No further treatment per neurology. -     dextroamphetamine-amphetamine (ADDERALL) 10 mg tablet; Take 1 Tablet by mouth two (2) times a day. Max Daily Amount: 20 mg. PT REQUEST THE \"BLUE PILL\", Print, Disp-180 Tablet, R-0  3. Hypersomnolence-continue Adderall.  -     dextroamphetamine-amphetamine (ADDERALL) 10 mg tablet; Take 1 Tablet by mouth two (2) times a day. Max Daily Amount: 20 mg. PT REQUEST THE \"BLUE PILL\", Print, Disp-180 Tablet, R-0  4. Gastroesophageal reflux disease without esophagitis-stable on Pepcid. 5. Mixed hyperlipidemia-LDL goal of 100. Check labs to be sure that is met. -     METABOLIC PANEL, COMPREHENSIVE; Future  -     CBC WITH AUTOMATED DIFF; Future  -     LIPID PANEL; Future  -     TSH 3RD GENERATION; Future  6. Bilateral carpal tunnel syndrome-we will try braces at night and see if that helps. 7. Primary hypertension-blood pressure well controlled. 8. Multiple sclerosis ([facility] [address]  9. SHANNAN (obstructive sleep apnea)  10. Fatigue due to sleep pattern disturbance  11. Neuropathic pain-we will try adding Cymbalta. If not helpful, increase gabapentin or change to Lyrica. 12. OAB (overactive bladder)-check urine today. Will treat with Detrol as needed for now at her request.  -     URINALYSIS W/MICROSCOPIC; Future  -     CULTURE, URINE; Future  13. Urinary incontinence, unspecified type  -     CULTURE, URINE; Future       Depression Risk Factor Screening     3 most recent PHQ Screens 8/5/2022   Little interest or pleasure in doing things Not at all   Feeling down, depressed, irritable, or hopeless Not at all   Total Score PHQ 2 0       Alcohol & Drug Abuse Risk Screen    Do you average more than 1 drink per night or more than 7 drinks a week:  No    On any one occasion in the past three months have you have had more than 3 drinks containing alcohol:  No          Functional Ability and Level of Safety    Hearing: Hearing is good. Activities of Daily Living: The home contains: no safety equipment.   Patient does total self care      Ambulation: with no difficulty     Fall Risk:  Fall Risk Assessment, last 12 mths 8/5/2022   Able to walk? Yes   Fall in past 12 months? 1   Do you feel unsteady? 1   Are you worried about falling 1   Is TUG test greater than 12 seconds? 1   Is the gait abnormal? 1   Number of falls in past 12 months 2   Fall with injury?  0      Abuse Screen:  Patient is not abused       Cognitive Screening    Has your family/caregiver stated any concerns about your memory: no         Health Maintenance Due     Health Maintenance Due   Topic Date Due    Shingrix Vaccine Age 49> (1 of 2) Never done    Pneumococcal 65+ years (1 - PCV) 06/16/2011    COVID-19 Vaccine (4 - Booster for Moderna series) 04/10/2022       Patient Care Team   Patient Care Team:  Gm Corea MD as PCP - [facility]  Gm Corea [facility]  Celi Pantoja MD as Physician (Gastroenterology)  Flora Waite as Physician (Neurology)  Chastity Cheng as Physician (Sleep Medicine Physician)  Dorothy Stephens OD (Optometry)    History     Patient Active Problem List   Diagnosis Code    GERD (gastroesophageal reflux disease) K21.9    Hyperlipemia E78.5    Osteopenia M85.80    Carpal tunnel syndrome G56.00    Hypertension I10    Chest pain R07.9    Advanced care planning/counseling discussion Z71.89    Neuropathic pain M79.2    Costochondritis M94.0    Fatigue due to sleep pattern disturbance R53.83, G47.9    SHANNAN (obstructive sleep apnea) G47.33    Multiple sclerosis ([address] G35     Past Medical History:   Diagnosis Date    Arthritis 1980    Asthma 1996    Autoimmune disease ([address] 2001    Carpal tunnel syndrome 04/13/2009    Chronic pain     GERD (gastroesophageal reflux disease) 04/13/2009    Headache     Hyperlipemia 04/13/2009    Hypertension 04/13/2009    MS (multiple sclerosis) ([address] 01/01/2001    Osteopenia 04/13/2009    Vertigo       Past Surgical History:   Procedure Laterality Date    HX HYSTERECTOMY      HX LUMBAR DISKECTOMY      HX ORTHOPAEDIC  1976    NE REMOVAL OF OVARIAN CYST(S)       Current Outpatient Medications Medication Sig Dispense Refill    varicella-zoster recombinant, PF, (Shingrix, PF,) 50 mcg/0.5 mL susr injection 0.5 mL by IntraMUSCular route once for 1 dose. 0.5 mL 1    pneumococcal 20-daljit conj-dip, PF, (PREVNAR 20) 0.5 mL syrg injection 0.5 mL by IntraMUSCular route once for 1 dose. 1 Each 0    dextroamphetamine-amphetamine (ADDERALL) 10 mg tablet Take 1 Tablet by mouth two (2) times a day. Max Daily Amount: 20 mg. PT REQUEST THE \"BLUE PILL\" 180 Tablet 0    scopolamine (TRANSDERM-SCOP) 1 mg over 3 days pt3d 1 Patch by TransDERmal route every seventy-two (72) hours. 3 Patch 0    DULoxetine (CYMBALTA) 30 mg capsule Take 1 Capsule by mouth in the morning. 30 Capsule 0    valsartan (DIOVAN) 80 mg tablet Take 1 Tablet by mouth two (2) times a day. 180 Tablet 0    gabapentin (NEURONTIN) 400 mg capsule TAKE 1 CAPSULE 3 TIMES A    Capsule 0    fluticasone propionate (FLONASE) 50 mcg/actuation nasal spray 2 Sprays by Both Nostrils route two (2) times daily as needed for Rhinitis. 3 Each 3    rosuvastatin (CRESTOR) 20 mg tablet Take 1 Tablet by mouth nightly. 90 Tablet 1    famotidine (PEPCID) 20 mg tablet Take 1 Tab by mouth two (2) times a day. fexofenadine (ALLEGRA) 180 mg tablet Take  by mouth.      cyanocobalamin, vitamin B-12, 1,000 mcg lozg 1 Tab by SubLINGual route daily. acetaminophen (TYLENOL) 325 mg tablet Take 2 Tabs by mouth two (2) times daily as needed for Pain. cetirizine (ZYRTEC) 10 mg tablet Take 10 mg by mouth daily as needed. camphor-menthol (SARNA) 0.5-0.5 % lotion Apply  to affected area as needed for Itching. 222 mL 0    cholecalciferol, vitamin D3, 50 mcg (2,000 unit) tab Take 1 Tab by mouth daily.        Allergies   Allergen Reactions    Augmentin [Amoxicillin-Pot Clavulanate] Other (comments)     \"sick\"    Sulfa (Sulfonamide Antibiotics) Hives and Rash    Zithromax [Azithromycin] Unknown (comments)     \"didn't work\"       Family History   Problem Relation Age of Onset Hypertension Mother     High Cholesterol Mother     Cancer Father         prostate    Asthma Father     High Cholesterol Sister     Cancer Brother         prostate    High Cholesterol Brother     High Cholesterol Sister     Dementia Sister      Social History     Tobacco Use    Smoking status: Never    Smokeless tobacco: Never   Substance Use Topics    Alcohol use: Never     Comment: rarely - occ         Satish Lau MD

## 2022-08-05 NOTE — PATIENT INSTRUCTIONS

## 2022-10-05 ENCOUNTER — PATIENT MESSAGE (OUTPATIENT)
Dept: INTERNAL MEDICINE CLINIC | Age: 77
End: 2022-10-05

## 2022-10-05 DIAGNOSIS — J06.9 UPPER RESPIRATORY TRACT INFECTION, UNSPECIFIED TYPE: Primary | ICD-10-CM

## 2022-10-05 RX ORDER — CODEINE PHOSPHATE AND GUAIFENESIN 10; 100 MG/5ML; MG/5ML
5 SOLUTION ORAL
Qty: 237 ML | Refills: 0 | Status: SHIPPED | OUTPATIENT
Start: 2022-10-05 | End: 2022-11-04

## 2022-10-07 ENCOUNTER — OFFICE VISIT (OUTPATIENT)
Dept: INTERNAL MEDICINE CLINIC | Age: 77
End: 2022-10-07
Payer: MEDICARE

## 2022-10-07 VITALS
HEIGHT: 62 IN | DIASTOLIC BLOOD PRESSURE: 74 MMHG | OXYGEN SATURATION: 97 % | SYSTOLIC BLOOD PRESSURE: 130 MMHG | BODY MASS INDEX: 25.98 KG/M2 | HEART RATE: 87 BPM | TEMPERATURE: 98.2 F | WEIGHT: 141.2 LBS

## 2022-10-07 DIAGNOSIS — G35 MULTIPLE SCLEROSIS (HCC): ICD-10-CM

## 2022-10-07 DIAGNOSIS — R32 URINARY INCONTINENCE, UNSPECIFIED TYPE: ICD-10-CM

## 2022-10-07 DIAGNOSIS — M79.672 PAIN IN BOTH FEET: ICD-10-CM

## 2022-10-07 DIAGNOSIS — I10 PRIMARY HYPERTENSION: ICD-10-CM

## 2022-10-07 DIAGNOSIS — G35 MS (MULTIPLE SCLEROSIS) (HCC): ICD-10-CM

## 2022-10-07 DIAGNOSIS — M79.671 PAIN IN BOTH FEET: ICD-10-CM

## 2022-10-07 DIAGNOSIS — E78.2 MIXED HYPERLIPIDEMIA: Primary | ICD-10-CM

## 2022-10-07 DIAGNOSIS — N32.81 OAB (OVERACTIVE BLADDER): ICD-10-CM

## 2022-10-07 LAB
APPEARANCE UR: CLEAR
BACTERIA URNS QL MICRO: NEGATIVE /HPF
BILIRUB UR QL: NEGATIVE
CK SERPL-CCNC: 114 U/L (ref 26–192)
COLOR UR: ABNORMAL
CRP SERPL HS-MCNC: 4.1 MG/L
EPITH CASTS URNS QL MICRO: ABNORMAL /LPF
ERYTHROCYTE [SEDIMENTATION RATE] IN BLOOD: 17 MM/HR (ref 0–30)
GLUCOSE UR STRIP.AUTO-MCNC: NEGATIVE MG/DL
HGB UR QL STRIP: NEGATIVE
HYALINE CASTS URNS QL MICRO: ABNORMAL /LPF (ref 0–5)
KETONES UR QL STRIP.AUTO: ABNORMAL MG/DL
LEUKOCYTE ESTERASE UR QL STRIP.AUTO: NEGATIVE
NITRITE UR QL STRIP.AUTO: NEGATIVE
PH UR STRIP: 5.5 [PH] (ref 5–8)
PROT UR STRIP-MCNC: NEGATIVE MG/DL
RBC #/AREA URNS HPF: ABNORMAL /HPF (ref 0–5)
SP GR UR REFRACTOMETRY: 1.02 (ref 1–1.03)
URATE SERPL-MCNC: 5.6 MG/DL (ref 2.6–6)
UROBILINOGEN UR QL STRIP.AUTO: 0.2 EU/DL (ref 0.2–1)
WBC URNS QL MICRO: ABNORMAL /HPF (ref 0–4)

## 2022-10-07 PROCEDURE — 99214 OFFICE O/P EST MOD 30 MIN: CPT | Performed by: INTERNAL MEDICINE

## 2022-10-07 PROCEDURE — 1101F PT FALLS ASSESS-DOCD LE1/YR: CPT | Performed by: INTERNAL MEDICINE

## 2022-10-07 PROCEDURE — G8510 SCR DEP NEG, NO PLAN REQD: HCPCS | Performed by: INTERNAL MEDICINE

## 2022-10-07 PROCEDURE — G8536 NO DOC ELDER MAL SCRN: HCPCS | Performed by: INTERNAL MEDICINE

## 2022-10-07 PROCEDURE — G8754 DIAS BP LESS 90: HCPCS | Performed by: INTERNAL MEDICINE

## 2022-10-07 PROCEDURE — G8399 PT W/DXA RESULTS DOCUMENT: HCPCS | Performed by: INTERNAL MEDICINE

## 2022-10-07 PROCEDURE — 1090F PRES/ABSN URINE INCON ASSESS: CPT | Performed by: INTERNAL MEDICINE

## 2022-10-07 PROCEDURE — G8752 SYS BP LESS 140: HCPCS | Performed by: INTERNAL MEDICINE

## 2022-10-07 PROCEDURE — G8417 CALC BMI ABV UP PARAM F/U: HCPCS | Performed by: INTERNAL MEDICINE

## 2022-10-07 PROCEDURE — G8427 DOCREV CUR MEDS BY ELIG CLIN: HCPCS | Performed by: INTERNAL MEDICINE

## 2022-10-07 PROCEDURE — G0463 HOSPITAL OUTPT CLINIC VISIT: HCPCS | Performed by: INTERNAL MEDICINE

## 2022-10-07 RX ORDER — GABAPENTIN 400 MG/1
400 CAPSULE ORAL 4 TIMES DAILY
Qty: 270 CAPSULE | Refills: 0 | COMMUNITY
Start: 2022-10-07

## 2022-10-07 RX ORDER — CEFUROXIME AXETIL 500 MG/1
500 TABLET ORAL 2 TIMES DAILY
Qty: 20 TABLET | Refills: 0 | Status: SHIPPED | OUTPATIENT
Start: 2022-10-07

## 2022-10-08 LAB
BACTERIA SPEC CULT: NORMAL
CC UR VC: NORMAL
SERVICE CMNT-IMP: NORMAL

## 2022-10-08 NOTE — PROGRESS NOTES
HPI:  Yissel Parra is a 69y.o. year old female who is here for a follow-up visit for multiple issues. Over the last 2 weeks she has had increasing upper respiratory symptoms. She has had cough productive of yellow sputum and yellow nasal discharge with postnasal drip. Some chills but no documented fever. Some sweats. Some dyspnea. No nausea or vomiting. Some ongoing increased fatigue and weakness in her legs as well as some left knee pain and increased burning pain in both legs. No change in bowel habits. She has been under increased stress associated with family issues. Past Medical History:   Diagnosis Date    Arthritis 1980    Asthma 1996    Autoimmune disease ([facility] [address] 2001    Carpal tunnel syndrome 04/13/2009    Chronic pain     GERD (gastroesophageal reflux disease) 04/13/2009    Headache     Hyperlipemia 04/13/2009    Hypertension 04/13/2009    MS (multiple sclerosis) ([facility] 01/01/2001    Osteopenia 04/13/2009    Vertigo        Past Surgical History:   Procedure Laterality Date    HX HYSTERECTOMY      HX LUMBAR DISKECTOMY      HX ORTHOPAEDIC  1976    DC REMOVAL OF OVARIAN CYST(S)         Prior to Admission medications    Medication Sig Start Date End Date Taking? Authorizing Provider   gabapentin (NEURONTIN) 400 mg capsule Take 1 Capsule by mouth four (4) times daily. Max Daily Amount: 1,600 mg. 10/7/22  Yes Amalia Aguilar MD   cefUROXime (CEFTIN) 500 mg tablet Take 1 Tablet by mouth two (2) times a day. 10/7/22  Yes Amalia Aguilar MD   guaiFENesin-codeine [facility] [place] 100-10 mg/5 mL solution Take 5 mL by mouth three (3) times daily as needed for Cough for up to 30 days. Max Daily Amount: 15 mL. 10/5/22 11/4/22 Yes Amalia Aguilar MD   dextroamphetamine-amphetamine (ADDERALL) 10 mg tablet Take 1 Tablet by mouth two (2) times a day.  Max Daily Amount: 20 mg. PT REQUEST THE \"BLUE PILL\" 8/5/22  Yes Amalia Aguilar MD   DULoxetine (CYMBALTA) 30 mg capsule Take 1 Capsule by mouth in the morning. 8/5/22  Yes Selestino Nissen, MD   valsartan (DIOVAN) 80 mg tablet Take 1 Tablet by mouth two (2) times a day. 7/22/22  Yes Selestino Nissen, MD   fluticasone propionate (FLONASE) 50 mcg/actuation nasal spray 2 Sprays by Both Nostrils route two (2) times daily as needed for Rhinitis. 3/25/22  Yes Selestino Nissen, MD   rosuvastatin (CRESTOR) 20 mg tablet Take 1 Tablet by mouth nightly. 10/4/21  Yes Selestino Nissen, MD   famotidine (PEPCID) 20 mg tablet Take 1 Tab by mouth two (2) times a day. 1/19/21  Yes Selestino Nissen, MD   fexofenadine (ALLEGRA) 180 mg tablet Take  by mouth. Yes Provider, Historical   cyanocobalamin, vitamin B-12, 1,000 mcg lozg 1 Tab by SubLINGual route daily. Yes Provider, Historical   acetaminophen (TYLENOL) 325 mg tablet Take 2 Tabs by mouth two (2) times daily as needed for Pain. 8/21/19  Yes Selestino Nissen, MD   cholecalciferol, vitamin D3, 50 mcg (2,000 unit) tab Take 1 Tab by mouth daily. Yes Provider, Historical   tolterodine ER (DETROL LA) 4 mg ER capsule Take 1 Capsule by mouth in the morning. 8/5/22   Selestino Nissen, MD   scopolamine (TRANSDERM-SCOP) 1 mg over 3 days pt3d 1 Patch by TransDERmal route every seventy-two (72) hours. 8/5/22 10/7/22  Selestino Nissen, MD   gabapentin (NEURONTIN) 400 mg capsule TAKE 1 CAPSULE 3 TIMES A   DAY 7/12/22 10/7/22  Britta Beebe III, MD   cetirizine (ZYRTEC) 10 mg tablet Take 10 mg by mouth daily as needed. 10/29/18 10/7/22  Selestino Nissen, MD   camphor-menthol [facility]) 0.5-0.5 % lotion Apply  to affected area as needed for Itching.  1/14/16 10/7/22  Selestino Nissen, MD       Social History     Socioeconomic History    Marital status:      Spouse name: Not on file    Number of children: Not on file    Years of education: Not on file    Highest education level: Not on file   Occupational History    Not on file   Tobacco Use    Smoking status: Never    Smokeless tobacco: Never   Vaping Use    Vaping Use: Never used   Substance and Sexual Activity    Alcohol use: Never     Comment: rarely - occ    Drug use: No    Sexual activity: Not Currently     Partners: Male   Other Topics Concern    Not on file   Social History Narrative    Not on file     Social Determinants of Health     Financial Resource Strain: Low Risk     Difficulty of Paying Living Expenses: Not hard at all   Food Insecurity: No Food Insecurity    Worried About Running Out of Food in the Last Year: Never true    Ran Out of Food in the Last Year: Never true   Transportation Needs: Not on file   Physical Activity: Not on file   Stress: Not on file   Social Connections: Not on file   Intimate Partner Violence: Not on file   Housing Stability: Not on file          ROS  Per HPI    Visit Vitals  /74   Pulse 87   Temp 98.2 °F (36.8 °C)   Ht 5' 2\" (1.575 m)   Wt 141 lb 3.2 oz (64 kg)   SpO2 97%   BMI 25.83 kg/m²         Physical Exam   Physical Examination: General appearance - alert, well appearing, and in no distress  Ears - bilateral TM's and external ear canals normal  Mouth - mucous membranes moist, pharynx normal without lesions  Neck - supple, no significant adenopathy  Lymphatics - no palpable lymphadenopathy, no hepatosplenomegaly  Chest - clear to auscultation, no wheezes, rales or rhonchi, symmetric air entry  Heart - normal rate and regular rhythm  Abdomen - soft, nontender, nondistended, no masses or organomegaly  Neurological - abnormal neurological exam unchanged from prior examinations  Musculoskeletal - abnormal exam of left knee with DJD changes and medial joint line tenderness. Extremities - peripheral pulses normal, no pedal edema, no clubbing or cyanosis      Assessment/Plan:  Diagnoses and all orders for this visit:    1. Mixed hyperlipidemia  -     CRP, HIGH SENSITIVITY; Future    2. Multiple sclerosis (HCC)-With some progression.   Discussed options as far as her pain including increasing Cymbalta or gabapentin. She elected to increase her gabapentin to 4 times a day and we will see how her pain goes. -     SED RATE (ESR); Future  -     CRP, HIGH SENSITIVITY; Future  -     CK; Future    3. Primary hypertension-blood pressure stable. 4. Pain in both feet-per above. -     SED RATE (ESR); Future  -     URIC ACID; Future    5. MS (multiple sclerosis) ([facility] [address]    6. OAB (overactive bladder)-does note some urinary urgency and frequency. Will check for infection. -     CULTURE, URINE  -     URINALYSIS W/MICROSCOPIC    7. Urinary incontinence, unspecified type  -     CULTURE, URINE  8. Upper respiratory infection-we will use her cough medicine and Flonase and if not improving fill antibiotics next week to cover bacterial infection. Other orders  -     cefUROXime (CEFTIN) 500 mg tablet; Take 1 Tablet by mouth two (2) times a day. Advised her to call back or return to office if symptoms worsen/change/persist.  Discussed expected course/resolution/complications of diagnosis in detail with patient. Medication risks/benefits/costs/interactions/alternatives discussed with patient. She was given an after visit summary which includes diagnoses, current medications, & vitals. She expressed understanding with the diagnosis and plan.

## 2022-12-13 DIAGNOSIS — G35 MS (MULTIPLE SCLEROSIS) (HCC): ICD-10-CM

## 2022-12-13 RX ORDER — GABAPENTIN 400 MG/1
CAPSULE ORAL
Qty: 270 CAPSULE | Refills: 0 | Status: SHIPPED | OUTPATIENT
Start: 2022-12-13

## 2023-01-20 DIAGNOSIS — I10 ESSENTIAL HYPERTENSION: ICD-10-CM

## 2023-01-21 RX ORDER — VALSARTAN 80 MG/1
TABLET ORAL
Qty: 180 TABLET | Refills: 0 | Status: SHIPPED | OUTPATIENT
Start: 2023-01-21

## 2023-02-24 ENCOUNTER — PATIENT MESSAGE (OUTPATIENT)
Dept: INTERNAL MEDICINE CLINIC | Age: 78
End: 2023-02-24

## 2023-02-24 DIAGNOSIS — G47.10 HYPERSOMNOLENCE: ICD-10-CM

## 2023-02-24 DIAGNOSIS — G35 MS (MULTIPLE SCLEROSIS) (HCC): ICD-10-CM

## 2023-02-24 DIAGNOSIS — E78.2 MIXED HYPERLIPIDEMIA: ICD-10-CM

## 2023-02-25 RX ORDER — DEXTROAMPHETAMINE SACCHARATE, AMPHETAMINE ASPARTATE, DEXTROAMPHETAMINE SULFATE AND AMPHETAMINE SULFATE 2.5; 2.5; 2.5; 2.5 MG/1; MG/1; MG/1; MG/1
10 TABLET ORAL 2 TIMES DAILY
Qty: 180 TABLET | Refills: 0 | Status: SHIPPED | OUTPATIENT
Start: 2023-02-25

## 2023-02-25 RX ORDER — ROSUVASTATIN CALCIUM 20 MG/1
20 TABLET, COATED ORAL
Qty: 90 TABLET | Refills: 1 | Status: SHIPPED | OUTPATIENT
Start: 2023-02-25

## 2023-04-19 ENCOUNTER — OFFICE VISIT (OUTPATIENT)
Dept: INTERNAL MEDICINE CLINIC | Age: 78
End: 2023-04-19
Payer: MEDICARE

## 2023-04-19 VITALS
WEIGHT: 147 LBS | TEMPERATURE: 97.5 F | RESPIRATION RATE: 16 BRPM | SYSTOLIC BLOOD PRESSURE: 147 MMHG | HEART RATE: 69 BPM | OXYGEN SATURATION: 98 % | DIASTOLIC BLOOD PRESSURE: 74 MMHG | BODY MASS INDEX: 27.05 KG/M2 | HEIGHT: 62 IN

## 2023-04-19 DIAGNOSIS — M79.672 PAIN IN BOTH FEET: Primary | ICD-10-CM

## 2023-04-19 DIAGNOSIS — R29.6 FALLS FREQUENTLY: ICD-10-CM

## 2023-04-19 DIAGNOSIS — G35 MULTIPLE SCLEROSIS (HCC): ICD-10-CM

## 2023-04-19 DIAGNOSIS — M79.671 PAIN IN BOTH FEET: Primary | ICD-10-CM

## 2023-04-19 DIAGNOSIS — N32.81 OAB (OVERACTIVE BLADDER): ICD-10-CM

## 2023-04-19 DIAGNOSIS — I10 ESSENTIAL HYPERTENSION: ICD-10-CM

## 2023-04-19 PROCEDURE — G8427 DOCREV CUR MEDS BY ELIG CLIN: HCPCS | Performed by: INTERNAL MEDICINE

## 2023-04-19 PROCEDURE — G0463 HOSPITAL OUTPT CLINIC VISIT: HCPCS | Performed by: INTERNAL MEDICINE

## 2023-04-19 PROCEDURE — 99214 OFFICE O/P EST MOD 30 MIN: CPT | Performed by: INTERNAL MEDICINE

## 2023-04-19 PROCEDURE — G8510 SCR DEP NEG, NO PLAN REQD: HCPCS | Performed by: INTERNAL MEDICINE

## 2023-04-19 PROCEDURE — 1090F PRES/ABSN URINE INCON ASSESS: CPT | Performed by: INTERNAL MEDICINE

## 2023-04-19 PROCEDURE — G8417 CALC BMI ABV UP PARAM F/U: HCPCS | Performed by: INTERNAL MEDICINE

## 2023-04-19 PROCEDURE — G8399 PT W/DXA RESULTS DOCUMENT: HCPCS | Performed by: INTERNAL MEDICINE

## 2023-04-19 PROCEDURE — G8536 NO DOC ELDER MAL SCRN: HCPCS | Performed by: INTERNAL MEDICINE

## 2023-04-19 PROCEDURE — 1101F PT FALLS ASSESS-DOCD LE1/YR: CPT | Performed by: INTERNAL MEDICINE

## 2023-04-19 RX ORDER — FLUTICASONE PROPIONATE 50 MCG
2 SPRAY, SUSPENSION (ML) NASAL
Qty: 3 EACH | Refills: 3 | Status: SHIPPED | OUTPATIENT
Start: 2023-04-19

## 2023-04-19 RX ORDER — VALSARTAN 80 MG/1
80 TABLET ORAL
Qty: 180 TABLET | Refills: 0 | COMMUNITY
Start: 2023-04-19

## 2023-04-19 NOTE — PROGRESS NOTES
HPI:  Kirsten Nick is a 69y.o. year old female who is here for a follow-up visit. She continues to have falls about twice per month. At times she feels that she is having dark notes to her vision and then at times she will fall to the floor and other she catches her self. Her last fall was in the last 2 weeks. She hit the back of her head against the shower door. Over the last couple days she has noticed a \"knot\" along her right collarbone. Slightly tender to the touch. No pain with movement. She does have ongoing issues with numbness and tingling throughout as well as pain in her right leg. She did not feel that Adderall was helping much. I had previously suggested follow-up with neurology and she was reluctant to do so. Past Medical History:   Diagnosis Date    Arthritis 1980    Asthma 1996    Autoimmune disease ([facility] 2001    Carpal tunnel syndrome 04/13/2009    Chronic pain     GERD (gastroesophageal reflux disease) 04/13/2009    Headache     Hyperlipemia 04/13/2009    Hypertension 04/13/2009    MS (multiple sclerosis) ([facility] [address] 01/01/2001    Osteopenia 04/13/2009    Vertigo        Past Surgical History:   Procedure Laterality Date    HX HYSTERECTOMY      HX LUMBAR DISKECTOMY      HX ORTHOPAEDIC  1976    AR REMOVAL OF OVARIAN CYST(S)         Prior to Admission medications    Medication Sig Start Date End Date Taking? Authorizing Provider   fluticasone propionate (FLONASE) 50 mcg/actuation nasal spray 2 Sprays by Both Nostrils route two (2) times daily as needed for Rhinitis. 4/19/23  Yes Becky Manuel MD   valsartan (DIOVAN) 80 mg tablet Take 1 Tablet by mouth nightly. 4/19/23  Yes Becky Manuel MD   gabapentin (NEURONTIN) 400 mg capsule TAKE 1 CAPSULE 3 TIMES A   DAY 12/13/22  Yes Becky Manuel MD   famotidine (PEPCID) 20 mg tablet Take 1 Tablet by mouth two (2) times a day. 1/19/21  Yes Becky Manuel MD   fexofenadine (ALLEGRA) 180 mg tablet Take  by mouth.    Yes Provider, Historical   cyanocobalamin, vitamin B-12, 1,000 mcg lozg 1 Tab by SubLINGual route daily. Yes Provider, Historical   acetaminophen (TYLENOL) 325 mg tablet Take 2 Tablets by mouth two (2) times daily as needed for Pain. 8/21/19  Yes Merline Nanny, MD   cholecalciferol, vitamin D3, 50 mcg (2,000 unit) tab Take 1 Tablet by mouth daily. Yes Provider, Historical   rosuvastatin (CRESTOR) 20 mg tablet Take 1 Tablet by mouth nightly. Patient not taking: Reported on 4/19/2023 2/25/23 4/19/23  Merline Nanny, MD   dextroamphetamine-amphetamine (ADDERALL) 10 mg tablet Take 1 Tablet by mouth two (2) times a day. Max Daily Amount: 20 mg. PT REQUEST THE \"BLUE PILL\" 2/25/23 4/19/23  Merline Nanny, MD   valsartan (DIOVAN) 80 mg tablet TAKE 1 TABLET TWICE A DAY  (DOSE ADJUSTMENT) 1/21/23 4/19/23  Constanza Kingston III, MD   cefUROXime (CEFTIN) 500 mg tablet Take 1 Tablet by mouth two (2) times a day. 10/7/22 4/19/23  Constanza Kingston III, MD   tolterodine ER (DETROL LA) 4 mg ER capsule Take 1 Capsule by mouth in the morning. Patient not taking: Reported on 4/19/2023 8/5/22   Merline Nanny, MD   DULoxetine (CYMBALTA) 30 mg capsule Take 1 Capsule by mouth in the morning. 8/5/22 4/19/23  Constanza Kingston III, MD   fluticasone propionate (FLONASE) 50 mcg/actuation nasal spray 2 Sprays by Both Nostrils route two (2) times daily as needed for Rhinitis.  3/25/22 4/19/23  Merline Nanny, MD       Social History     Socioeconomic History    Marital status:      Spouse name: Not on file    Number of children: Not on file    Years of education: Not on file    Highest education level: Not on file   Occupational History    Not on file   Tobacco Use    Smoking status: Never    Smokeless tobacco: Never   Vaping Use    Vaping Use: Never used   Substance and Sexual Activity    Alcohol use: Never     Comment: rarely - occ    Drug use: No    Sexual activity: Not Currently     Partners: Male   Other Topics Concern    Not on file   Social History Narrative    Not on file     Social Determinants of Health     Financial Resource Strain: Low Risk     Difficulty of Paying Living Expenses: Not hard at all   Food Insecurity: No Food Insecurity    Worried About Running Out of Food in the Last Year: Never true    Ran Out of Food in the Last Year: Never true   Transportation Needs: Not on file   Physical Activity: Not on file   Stress: Not on file   Social Connections: Not on file   Intimate Partner Violence: Not on file   Housing Stability: Not on file          ROS  Per HPI    Visit Vitals  BP (!) 147/74   Pulse 69   Temp 97.5 °F (36.4 °C) (Temporal)   Resp 16   Ht 5' 2\" (1.575 m)   Wt 147 lb (66.7 kg)   SpO2 98%   BMI 26.89 kg/m²         Physical Exam   Physical Examination: General appearance - alert, well appearing, and in no distress  Chest - clear to auscultation, no wheezes, rales or rhonchi, symmetric air entry  Heart - normal rate and regular rhythm  Neurological - abnormal neurological exam unchanged from prior examinations  Musculoskeletal - abnormal exam of right collar bone with slight prominence of the sternoclavicular joint and mild tenderness. No other bony deformity. Assessment/Plan:  Diagnoses and all orders for this visit:    1. Pain in both feet-previously failed Lyrica and Cymbalta. We will continue gabapentin for now to avoid additional sedation. 2. Essential hypertension-? Orthostasis. At this point we will reduce her valsartan to 1 a day and monitor orthostatic blood pressures at home. 3. Multiple sclerosis (HCC)-discussed seeing neurology again and she would consider if there were options. I will do some research and decide regarding a proper referral.  -     REFERRAL TO PHYSICAL THERAPY    4. OAB (overactive bladder)-stable off meds. 5. Falls frequently-we will refer for physical therapy to evaluate and treat to avoid further falls.     Other orders  -     fluticasone propionate (FLONASE) 50 mcg/actuation nasal spray; 2 Sprays by Both Nostrils route two (2) times daily as needed for Rhinitis. Advised her to call back or return to office if symptoms worsen/change/persist.  Discussed expected course/resolution/complications of diagnosis in detail with patient. Medication risks/benefits/costs/interactions/alternatives discussed with patient. She was given an after visit summary which includes diagnoses, current medications, & vitals. She expressed understanding with the diagnosis and plan.

## 2023-04-22 DIAGNOSIS — G35 MS (MULTIPLE SCLEROSIS) (HCC): ICD-10-CM

## 2023-04-23 RX ORDER — GABAPENTIN 400 MG/1
CAPSULE ORAL
Qty: 270 CAPSULE | Refills: 0 | Status: SHIPPED | OUTPATIENT
Start: 2023-04-23

## 2023-06-15 NOTE — TELEPHONE ENCOUNTER
Orders Placed This Encounter    escitalopram oxalate (LEXAPRO) 10 mg tablet     Sig: Take 1 Tab by mouth daily. Dispense:  90 Tab     Refill:  1     The above orders were approved via [facility] per Dr. Arnie Chavira, III.
done

## 2023-07-27 RX ORDER — VALSARTAN 80 MG/1
TABLET ORAL
Qty: 180 TABLET | Refills: 0 | Status: SHIPPED | OUTPATIENT
Start: 2023-07-27

## 2023-09-06 RX ORDER — TOLTERODINE 4 MG/1
4 CAPSULE, EXTENDED RELEASE ORAL DAILY
Qty: 90 CAPSULE | Refills: 1 | Status: SHIPPED | OUTPATIENT
Start: 2023-09-06

## 2023-09-07 DIAGNOSIS — G35 MULTIPLE SCLEROSIS (HCC): Primary | ICD-10-CM

## 2023-09-07 RX ORDER — GABAPENTIN 400 MG/1
CAPSULE ORAL
Qty: 270 CAPSULE | Refills: 1 | Status: SHIPPED | OUTPATIENT
Start: 2023-09-07 | End: 2024-03-05

## 2023-09-26 ENCOUNTER — TELEPHONE (OUTPATIENT)
Age: 78
End: 2023-09-26

## 2023-09-26 NOTE — TELEPHONE ENCOUNTER
Reason for call:  TC from pt. Pt id verified. Pt states she is returning a nurse call. Pt states she wants to let Binh Torres know that \"yes\" she wants to do a wellness visit on Oct 2 with her flu shot, please.      Is this a new problem: No    Date of last appointment:  4/19/2023     Can we respond via [employer] [facility]: No    Best call back number: [phone]

## 2023-09-26 NOTE — TELEPHONE ENCOUNTER
[facility] scheduled patient with you for Flu Shot, montez Alexander cannot schedule pts for flu shots only at this time d/t inventory. I changed pt to AWV since she will be due so she can do both that day. FYI.     Future Appointments   Date Time Provider [address]   10/2/2023  1:30 PM Seferino Mchugh MD [facility] SERRANO [facility]

## 2023-09-27 SDOH — HEALTH STABILITY: PHYSICAL HEALTH
ON AVERAGE, HOW MANY DAYS PER WEEK DO YOU ENGAGE IN MODERATE TO STRENUOUS EXERCISE (LIKE A BRISK WALK)?: PATIENT DECLINED

## 2023-09-27 SDOH — HEALTH STABILITY: PHYSICAL HEALTH: ON AVERAGE, HOW MANY MINUTES DO YOU ENGAGE IN EXERCISE AT THIS LEVEL?: PATIENT DECLINED

## 2023-09-27 ASSESSMENT — LIFESTYLE VARIABLES
HOW OFTEN DO YOU HAVE A DRINK CONTAINING ALCOHOL: NEVER
HOW MANY STANDARD DRINKS CONTAINING ALCOHOL DO YOU HAVE ON A TYPICAL DAY: 0
HOW OFTEN DO YOU HAVE SIX OR MORE DRINKS ON ONE OCCASION: 1
HOW MANY STANDARD DRINKS CONTAINING ALCOHOL DO YOU HAVE ON A TYPICAL DAY: PATIENT DOES NOT DRINK
HOW OFTEN DO YOU HAVE A DRINK CONTAINING ALCOHOL: 1

## 2023-09-27 ASSESSMENT — PATIENT HEALTH QUESTIONNAIRE - PHQ9
SUM OF ALL RESPONSES TO PHQ QUESTIONS 1-9: 0
SUM OF ALL RESPONSES TO PHQ9 QUESTIONS 1 & 2: 0
SUM OF ALL RESPONSES TO PHQ QUESTIONS 1-9: 0
SUM OF ALL RESPONSES TO PHQ QUESTIONS 1-9: 0
1. LITTLE INTEREST OR PLEASURE IN DOING THINGS: 0
SUM OF ALL RESPONSES TO PHQ QUESTIONS 1-9: 0
2. FEELING DOWN, DEPRESSED OR HOPELESS: 0

## 2023-09-29 SDOH — ECONOMIC STABILITY: HOUSING INSECURITY
IN THE LAST 12 MONTHS, WAS THERE A TIME WHEN YOU DID NOT HAVE A STEADY PLACE TO SLEEP OR SLEPT IN A SHELTER (INCLUDING NOW)?: NO

## 2023-09-29 SDOH — ECONOMIC STABILITY: INCOME INSECURITY: HOW HARD IS IT FOR YOU TO PAY FOR THE VERY BASICS LIKE FOOD, HOUSING, MEDICAL CARE, AND HEATING?: PATIENT DECLINED

## 2023-09-29 SDOH — ECONOMIC STABILITY: FOOD INSECURITY: WITHIN THE PAST 12 MONTHS, YOU WORRIED THAT YOUR FOOD WOULD RUN OUT BEFORE YOU GOT MONEY TO BUY MORE.: PATIENT DECLINED

## 2023-09-29 SDOH — ECONOMIC STABILITY: TRANSPORTATION INSECURITY
IN THE PAST 12 MONTHS, HAS LACK OF TRANSPORTATION KEPT YOU FROM MEETINGS, WORK, OR FROM GETTING THINGS NEEDED FOR DAILY LIVING?: YES

## 2023-09-29 SDOH — ECONOMIC STABILITY: FOOD INSECURITY: WITHIN THE PAST 12 MONTHS, THE FOOD YOU BOUGHT JUST DIDN'T LAST AND YOU DIDN'T HAVE MONEY TO GET MORE.: PATIENT DECLINED

## 2023-10-02 ENCOUNTER — TELEPHONE (OUTPATIENT)
Age: 78
End: 2023-10-02

## 2023-10-02 ENCOUNTER — OFFICE VISIT (OUTPATIENT)
Age: 78
End: 2023-10-02
Payer: MEDICARE

## 2023-10-02 VITALS
SYSTOLIC BLOOD PRESSURE: 138 MMHG | BODY MASS INDEX: 26.68 KG/M2 | RESPIRATION RATE: 14 BRPM | DIASTOLIC BLOOD PRESSURE: 80 MMHG | WEIGHT: 145 LBS | HEIGHT: 62 IN | OXYGEN SATURATION: 99 % | TEMPERATURE: 98.2 F | HEART RATE: 76 BPM

## 2023-10-02 DIAGNOSIS — G35 MULTIPLE SCLEROSIS (HCC): ICD-10-CM

## 2023-10-02 DIAGNOSIS — Z23 ENCOUNTER FOR IMMUNIZATION: ICD-10-CM

## 2023-10-02 DIAGNOSIS — I10 ESSENTIAL (PRIMARY) HYPERTENSION: ICD-10-CM

## 2023-10-02 DIAGNOSIS — Z00.00 MEDICARE ANNUAL WELLNESS VISIT, SUBSEQUENT: Primary | ICD-10-CM

## 2023-10-02 DIAGNOSIS — M79.2 NEURALGIA AND NEURITIS, UNSPECIFIED: ICD-10-CM

## 2023-10-02 DIAGNOSIS — N32.81 OVERACTIVE BLADDER: ICD-10-CM

## 2023-10-02 DIAGNOSIS — E78.2 MIXED HYPERLIPIDEMIA: ICD-10-CM

## 2023-10-02 PROCEDURE — 1123F ACP DISCUSS/DSCN MKR DOCD: CPT | Performed by: INTERNAL MEDICINE

## 2023-10-02 PROCEDURE — 1090F PRES/ABSN URINE INCON ASSESS: CPT | Performed by: INTERNAL MEDICINE

## 2023-10-02 PROCEDURE — 99214 OFFICE O/P EST MOD 30 MIN: CPT | Performed by: INTERNAL MEDICINE

## 2023-10-02 PROCEDURE — 3079F DIAST BP 80-89 MM HG: CPT | Performed by: INTERNAL MEDICINE

## 2023-10-02 PROCEDURE — PBSHW INFLUENZA, FLUAD, (AGE 65 Y+), IM, PF, 0.5 ML: Performed by: INTERNAL MEDICINE

## 2023-10-02 PROCEDURE — 1036F TOBACCO NON-USER: CPT | Performed by: INTERNAL MEDICINE

## 2023-10-02 PROCEDURE — G8419 CALC BMI OUT NRM PARAM NOF/U: HCPCS | Performed by: INTERNAL MEDICINE

## 2023-10-02 PROCEDURE — G8484 FLU IMMUNIZE NO ADMIN: HCPCS | Performed by: INTERNAL MEDICINE

## 2023-10-02 PROCEDURE — 3075F SYST BP GE 130 - 139MM HG: CPT | Performed by: INTERNAL MEDICINE

## 2023-10-02 PROCEDURE — G0439 PPPS, SUBSEQ VISIT: HCPCS | Performed by: INTERNAL MEDICINE

## 2023-10-02 PROCEDURE — G8399 PT W/DXA RESULTS DOCUMENT: HCPCS | Performed by: INTERNAL MEDICINE

## 2023-10-02 PROCEDURE — G8427 DOCREV CUR MEDS BY ELIG CLIN: HCPCS | Performed by: INTERNAL MEDICINE

## 2023-10-02 PROCEDURE — 90694 VACC AIIV4 NO PRSRV 0.5ML IM: CPT | Performed by: INTERNAL MEDICINE

## 2023-10-02 RX ORDER — CALCIUM CARBONATE/SIMETHICONE 1000-60 MG
1 TABLET,CHEWABLE ORAL 3 TIMES DAILY PRN
Refills: 0 | COMMUNITY
Start: 2023-10-02

## 2023-10-02 NOTE — PATIENT INSTRUCTIONS
online. You need 7300-4360 mg of calcium and 8405-9323 IU of vitamin D per day. It is possible to meet your calcium requirement with diet alone, but a vitamin D supplement is usually necessary to meet this goal.  When exposed to the sun, use a sunscreen that protects against both UVA and UVB radiation with an SPF of 30 or greater. Reapply every 2 to 3 hours or after sweating, drying off with a towel, or swimming. Always wear a seat belt when traveling in a car. Always wear a helmet when riding a bicycle or motorcycle.

## 2023-10-05 LAB
ALBUMIN SERPL-MCNC: 4.5 G/DL (ref 3.8–4.8)
ALBUMIN/GLOB SERPL: 2.1 {RATIO} (ref 1.2–2.2)
ALP SERPL-CCNC: 81 IU/L (ref 44–121)
ALT SERPL-CCNC: 37 IU/L (ref 0–32)
AST SERPL-CCNC: 33 IU/L (ref 0–40)
BASOPHILS # BLD AUTO: 0 X10E3/UL (ref 0–0.2)
BASOPHILS NFR BLD AUTO: 1 %
BILIRUB SERPL-MCNC: 0.6 MG/DL (ref 0–1.2)
BUN SERPL-MCNC: 18 MG/DL (ref 8–27)
BUN/CREAT SERPL: 22 (ref 12–28)
CALCIUM SERPL-MCNC: 10 MG/DL (ref 8.7–10.3)
CHLORIDE SERPL-SCNC: 104 MMOL/L (ref 96–106)
CO2 SERPL-SCNC: 25 MMOL/L (ref 20–29)
CREAT SERPL-MCNC: 0.83 MG/DL (ref 0.57–1)
EGFRCR SERPLBLD CKD-EPI 2021: 73 ML/MIN/1.73
EOSINOPHIL # BLD AUTO: 0.2 X10E3/UL (ref 0–0.4)
EOSINOPHIL NFR BLD AUTO: 3 %
ERYTHROCYTE [DISTWIDTH] IN BLOOD BY AUTOMATED COUNT: 12.6 % (ref 11.7–15.4)
GLOBULIN SER CALC-MCNC: 2.1 G/DL (ref 1.5–4.5)
GLUCOSE SERPL-MCNC: 102 MG/DL (ref 70–99)
HCT VFR BLD AUTO: 38.3 % (ref 34–46.6)
HGB BLD-MCNC: 13.1 G/DL (ref 11.1–15.9)
IMM GRANULOCYTES # BLD AUTO: 0 X10E3/UL (ref 0–0.1)
IMM GRANULOCYTES NFR BLD AUTO: 0 %
LYMPHOCYTES # BLD AUTO: 1.4 X10E3/UL (ref 0.7–3.1)
LYMPHOCYTES NFR BLD AUTO: 29 %
MCH RBC QN AUTO: 29.8 PG (ref 26.6–33)
MCHC RBC AUTO-ENTMCNC: 34.2 G/DL (ref 31.5–35.7)
MCV RBC AUTO: 87 FL (ref 79–97)
MONOCYTES # BLD AUTO: 0.6 X10E3/UL (ref 0.1–0.9)
MONOCYTES NFR BLD AUTO: 12 %
NEUTROPHILS # BLD AUTO: 2.7 X10E3/UL (ref 1.4–7)
NEUTROPHILS NFR BLD AUTO: 55 %
PLATELET # BLD AUTO: 253 X10E3/UL (ref 150–450)
POTASSIUM SERPL-SCNC: 4.9 MMOL/L (ref 3.5–5.2)
PROT SERPL-MCNC: 6.6 G/DL (ref 6–8.5)
RBC # BLD AUTO: 4.39 X10E6/UL (ref 3.77–5.28)
SODIUM SERPL-SCNC: 141 MMOL/L (ref 134–144)
TSH SERPL DL<=0.005 MIU/L-ACNC: 2.86 UIU/ML (ref 0.45–4.5)
WBC # BLD AUTO: 4.9 X10E3/UL (ref 3.4–10.8)

## 2023-10-05 NOTE — ED TRIAGE NOTES
Fever with productive cough and vomiting onset yesterday. Colchicine Counseling:  Patient counseled regarding adverse effects including but not limited to stomach upset (nausea, vomiting, stomach pain, or diarrhea).  Patient instructed to limit alcohol consumption while taking this medication.  Colchicine may reduce blood counts especially with prolonged use.  The patient understands that monitoring of kidney function and blood counts may be required, especially at baseline. The patient verbalized understanding of the proper use and possible adverse effects of colchicine.  All of the patient's questions and concerns were addressed.

## 2023-12-11 RX ORDER — SCOLOPAMINE TRANSDERMAL SYSTEM 1 MG/1
1 PATCH, EXTENDED RELEASE TRANSDERMAL
Qty: 4 PATCH | Refills: 1 | Status: CANCELLED | OUTPATIENT
Start: 2023-12-11

## 2023-12-12 RX ORDER — SCOLOPAMINE TRANSDERMAL SYSTEM 1 MG/1
1 PATCH, EXTENDED RELEASE TRANSDERMAL
Qty: 4 PATCH | Refills: 0 | Status: SHIPPED | OUTPATIENT
Start: 2023-12-12

## 2023-12-14 ENCOUNTER — TELEMEDICINE (OUTPATIENT)
Age: 78
End: 2023-12-14
Payer: MEDICARE

## 2023-12-14 DIAGNOSIS — J01.10 ACUTE NON-RECURRENT FRONTAL SINUSITIS: Primary | ICD-10-CM

## 2023-12-14 PROCEDURE — G8399 PT W/DXA RESULTS DOCUMENT: HCPCS | Performed by: NURSE PRACTITIONER

## 2023-12-14 PROCEDURE — 1123F ACP DISCUSS/DSCN MKR DOCD: CPT | Performed by: NURSE PRACTITIONER

## 2023-12-14 PROCEDURE — G8427 DOCREV CUR MEDS BY ELIG CLIN: HCPCS | Performed by: NURSE PRACTITIONER

## 2023-12-14 PROCEDURE — 99213 OFFICE O/P EST LOW 20 MIN: CPT | Performed by: NURSE PRACTITIONER

## 2023-12-14 PROCEDURE — 1090F PRES/ABSN URINE INCON ASSESS: CPT | Performed by: NURSE PRACTITIONER

## 2023-12-14 RX ORDER — DOXYCYCLINE HYCLATE 100 MG
100 TABLET ORAL 2 TIMES DAILY
Qty: 20 TABLET | Refills: 0 | Status: SHIPPED | OUTPATIENT
Start: 2023-12-14 | End: 2023-12-24

## 2023-12-14 NOTE — PROGRESS NOTES
Patricia Gallagher is a 69 y.o. female who was seen by synchronous (real-time) audio-video technology on 12/14/2023. Assessment & Plan:   Below is the assessment and plan developed based on review of pertinent history, physical exam (if applicable), labs, studies, and medications. 1. Acute non-recurrent frontal sinusitis   Antibiotic as prescribed  Mucinex  Follow-up if no improvement    I spent at least 23 minutes on this visit with this established patient. ([id number])  Subjective:   Patricia Gallagher was seen for Sinusitis and Cough    Patient reports sneezing, runny nose a few weeks ago. It is getting progressively worse. She notes chest congestion, yellow sputum, ear fullness, post-nasal drip, sinus congestion, and GI upset. She had fever 100.5 initially. She was taking advil and tylenol. She has tried flonase, and cough syrup. Prior to Admission medications    Medication Sig Start Date End Date Taking? Authorizing Provider   doxycycline hyclate (VIBRA-TABS) 100 MG tablet Take 1 tablet by mouth 2 times daily for 10 days 12/14/23 12/24/23 Yes Anjali Reedite, APRN - NP   scopolamine (TRANSDERM-SCOP) transdermal patch Place 1 patch onto the skin every 72 hours 12/12/23   Alesia Guillen MD   Calcium Carbonate-Simethicone (MAALOX ADVANCED MAX ST) 1000-60 MG CHEW Take 1 tablet by mouth 3 times daily as needed (gas) 10/2/23   Alesia Guillen MD   gabapentin (NEURONTIN) 400 MG capsule TAKE 1 CAPSULE 3 TIMES A   DAY  Patient taking differently: Take 1 capsule by mouth every evening.  9/7/23 3/5/24  Alesia Guillen MD   tolterodine (DETROL LA) 4 MG extended release capsule Take 1 capsule by mouth daily 9/6/23   Alesia Guillen MD   valsartan (DIOVAN) 80 MG tablet TAKE 1 TABLET TWICE A DAY  (DOSE ADJUSTMENT) 7/27/23   Alesia Guillen MD   acetaminophen (TYLENOL) 325 MG tablet Take by mouth 2 times daily as needed 8/21/19   Automatic Reconciliation, Ar   Cholecalciferol 50 MCG (2000 UT) TABS Take 1 tablet by

## 2024-01-23 ENCOUNTER — PATIENT MESSAGE (OUTPATIENT)
Age: 79
End: 2024-01-23

## 2024-01-23 RX ORDER — VALSARTAN 80 MG/1
TABLET ORAL
Qty: 180 TABLET | Refills: 0 | Status: SHIPPED | OUTPATIENT
Start: 2024-01-23

## 2024-01-23 NOTE — TELEPHONE ENCOUNTER
From: Alma Yang  To: Dr. Arnaldo Akers  Sent: 1/23/2024 1:18 PM EST  Subject: [facility]    Khoa Pacheco and Payton, I know you are busy and please forgive me for bothering you. I tried to get a refill for Valsartan Tab 80mg ( I have 9 tablets left)from [facility] last week but could not use online banking to pay them. They want to draft my checking account or use a credit card. Gustavo and I had difficulty with them charging us twice for meds in the past. With that being said, can you send a script to [facility] in [place] for 90 days supply of Valsartan for me, please? Thank you, Alma

## 2024-01-24 ENCOUNTER — PATIENT MESSAGE (OUTPATIENT)
Age: 79
End: 2024-01-24

## 2024-01-29 RX ORDER — VALSARTAN 80 MG/1
TABLET ORAL
Qty: 180 TABLET | Refills: 1 | Status: SHIPPED | OUTPATIENT
Start: 2024-01-29

## 2024-03-30 ENCOUNTER — PATIENT MESSAGE (OUTPATIENT)
Age: 79
End: 2024-03-30

## 2024-04-01 RX ORDER — TOLTERODINE 4 MG/1
4 CAPSULE, EXTENDED RELEASE ORAL DAILY
Qty: 90 CAPSULE | Refills: 1 | Status: SHIPPED | OUTPATIENT
Start: 2024-04-01

## 2024-04-01 NOTE — TELEPHONE ENCOUNTER
From: Alma Yang  To: Dr. Arnaldo Akers  Sent: 3/30/2024 4:39 PM EDT  Subject: Medicine    Payton, was a script sent to [facility] for Tolterodine Tartrate  90 because no refill remains. Thank you, Alma

## 2024-04-08 RX ORDER — TOLTERODINE 4 MG/1
4 CAPSULE, EXTENDED RELEASE ORAL DAILY
Qty: 90 CAPSULE | Refills: 1 | Status: SHIPPED | OUTPATIENT
Start: 2024-04-08

## 2024-05-29 ENCOUNTER — PATIENT MESSAGE (OUTPATIENT)
Age: 79
End: 2024-05-29

## 2024-05-29 DIAGNOSIS — R05.3 CHRONIC COUGH: Primary | ICD-10-CM

## 2024-05-29 RX ORDER — FLUTICASONE PROPIONATE 50 MCG
2 SPRAY, SUSPENSION (ML) NASAL 2 TIMES DAILY PRN
Qty: 16 G | Refills: 5 | Status: SHIPPED | OUTPATIENT
Start: 2024-05-29

## 2024-05-29 RX ORDER — CODEINE PHOSPHATE/GUAIFENESIN 10-100MG/5
5 LIQUID (ML) ORAL 3 TIMES DAILY PRN
Qty: 237 ML | Refills: 0 | Status: SHIPPED | OUTPATIENT
Start: 2024-05-29 | End: 2024-06-28

## 2024-05-29 NOTE — TELEPHONE ENCOUNTER
From: Alma Yang  To: Dr. Arnaldo Akers  Sent: 5/29/2024 2:31 PM EDT  Subject: Medicine and Toe    Hi Junior and Payton, may I have my cough and flonase meds refilled? Please send to [facility] in [place], [phone]. Also, my big toe still hurts, may be fungus or bunion?? I am not able to drive to your office bc my vision is blurry and I don't feel stable enough to walk alone. Thanks for your help. Alma

## 2024-06-06 DIAGNOSIS — G35 MULTIPLE SCLEROSIS (HCC): ICD-10-CM

## 2024-06-06 RX ORDER — GABAPENTIN 400 MG/1
400 CAPSULE ORAL 3 TIMES DAILY
Qty: 270 CAPSULE | Refills: 1 | Status: SHIPPED | OUTPATIENT
Start: 2024-06-06 | End: 2024-12-03

## 2024-06-06 NOTE — TELEPHONE ENCOUNTER
Medication Refill Request    Alma Yang is requesting a refill of the following medication(s):   gabapentin (NEURONTIN) 400 MG capsule                 Please send refill to:     [facility] [place] - [facility] -  [phone] - F [phone]  [facility]  [place] [address]  Phone: [phone] Fax: [phone]

## 2024-06-06 NOTE — TELEPHONE ENCOUNTER
Chief Complaint   Patient presents with    Medication Refill     Last Appointment with Dr. Arnaldo Akers 10/2/23  No future appointments.

## 2024-07-26 RX ORDER — VALSARTAN 80 MG/1
TABLET ORAL
Qty: 180 TABLET | Refills: 3 | Status: SHIPPED | OUTPATIENT
Start: 2024-07-26

## 2024-09-29 RX ORDER — TOLTERODINE 4 MG/1
4 CAPSULE, EXTENDED RELEASE ORAL DAILY
Qty: 90 CAPSULE | Refills: 1 | Status: SHIPPED | OUTPATIENT
Start: 2024-09-29

## 2024-10-22 ENCOUNTER — PATIENT MESSAGE (OUTPATIENT)
Age: 79
End: 2024-10-22

## 2024-10-22 RX ORDER — TOLTERODINE 4 MG/1
4 CAPSULE, EXTENDED RELEASE ORAL DAILY
Qty: 90 CAPSULE | Refills: 1 | Status: SHIPPED | OUTPATIENT
Start: 2024-10-22

## 2024-10-22 RX ORDER — TOLTERODINE 4 MG/1
4 CAPSULE, EXTENDED RELEASE ORAL DAILY
Qty: 90 CAPSULE | Refills: 1 | Status: SHIPPED | OUTPATIENT
Start: 2024-10-22 | End: 2024-10-22

## 2024-11-17 SDOH — ECONOMIC STABILITY: INCOME INSECURITY: HOW HARD IS IT FOR YOU TO PAY FOR THE VERY BASICS LIKE FOOD, HOUSING, MEDICAL CARE, AND HEATING?: PATIENT DECLINED

## 2024-11-17 SDOH — ECONOMIC STABILITY: TRANSPORTATION INSECURITY
IN THE PAST 12 MONTHS, HAS LACK OF TRANSPORTATION KEPT YOU FROM MEETINGS, WORK, OR FROM GETTING THINGS NEEDED FOR DAILY LIVING?: PATIENT DECLINED

## 2024-11-17 SDOH — HEALTH STABILITY: PHYSICAL HEALTH: ON AVERAGE, HOW MANY MINUTES DO YOU ENGAGE IN EXERCISE AT THIS LEVEL?: PATIENT DECLINED

## 2024-11-17 SDOH — HEALTH STABILITY: PHYSICAL HEALTH: ON AVERAGE, HOW MANY DAYS PER WEEK DO YOU ENGAGE IN MODERATE TO STRENUOUS EXERCISE (LIKE A BRISK WALK)?: 0 DAYS

## 2024-11-17 SDOH — ECONOMIC STABILITY: FOOD INSECURITY: WITHIN THE PAST 12 MONTHS, YOU WORRIED THAT YOUR FOOD WOULD RUN OUT BEFORE YOU GOT MONEY TO BUY MORE.: PATIENT DECLINED

## 2024-11-17 SDOH — ECONOMIC STABILITY: FOOD INSECURITY: WITHIN THE PAST 12 MONTHS, THE FOOD YOU BOUGHT JUST DIDN'T LAST AND YOU DIDN'T HAVE MONEY TO GET MORE.: PATIENT DECLINED

## 2024-11-17 ASSESSMENT — LIFESTYLE VARIABLES
HOW OFTEN DO YOU HAVE A DRINK CONTAINING ALCOHOL: NEVER
HOW MANY STANDARD DRINKS CONTAINING ALCOHOL DO YOU HAVE ON A TYPICAL DAY: 0
HOW OFTEN DO YOU HAVE SIX OR MORE DRINKS ON ONE OCCASION: 1
HOW OFTEN DO YOU HAVE A DRINK CONTAINING ALCOHOL: 1
HOW MANY STANDARD DRINKS CONTAINING ALCOHOL DO YOU HAVE ON A TYPICAL DAY: PATIENT DOES NOT DRINK

## 2024-11-17 ASSESSMENT — PATIENT HEALTH QUESTIONNAIRE - PHQ9
SUM OF ALL RESPONSES TO PHQ QUESTIONS 1-9: 0
SUM OF ALL RESPONSES TO PHQ9 QUESTIONS 1 & 2: 0
1. LITTLE INTEREST OR PLEASURE IN DOING THINGS: NOT AT ALL
SUM OF ALL RESPONSES TO PHQ QUESTIONS 1-9: 0
2. FEELING DOWN, DEPRESSED OR HOPELESS: NOT AT ALL
SUM OF ALL RESPONSES TO PHQ QUESTIONS 1-9: 0
SUM OF ALL RESPONSES TO PHQ QUESTIONS 1-9: 0

## 2024-11-20 ENCOUNTER — OFFICE VISIT (OUTPATIENT)
Age: 79
End: 2024-11-20
Payer: MEDICARE

## 2024-11-20 VITALS
OXYGEN SATURATION: 97 % | HEIGHT: 62 IN | DIASTOLIC BLOOD PRESSURE: 74 MMHG | RESPIRATION RATE: 15 BRPM | WEIGHT: 146.6 LBS | SYSTOLIC BLOOD PRESSURE: 149 MMHG | TEMPERATURE: 98.2 F | HEART RATE: 73 BPM | BODY MASS INDEX: 26.98 KG/M2

## 2024-11-20 DIAGNOSIS — E78.2 MIXED HYPERLIPIDEMIA: ICD-10-CM

## 2024-11-20 DIAGNOSIS — E55.9 VITAMIN D DEFICIENCY: ICD-10-CM

## 2024-11-20 DIAGNOSIS — Z23 ENCOUNTER FOR IMMUNIZATION: ICD-10-CM

## 2024-11-20 DIAGNOSIS — G35 MULTIPLE SCLEROSIS (HCC): ICD-10-CM

## 2024-11-20 DIAGNOSIS — Z00.00 MEDICARE ANNUAL WELLNESS VISIT, SUBSEQUENT: Primary | ICD-10-CM

## 2024-11-20 DIAGNOSIS — I10 ESSENTIAL (PRIMARY) HYPERTENSION: ICD-10-CM

## 2024-11-20 DIAGNOSIS — K59.00 CONSTIPATION, UNSPECIFIED CONSTIPATION TYPE: ICD-10-CM

## 2024-11-20 DIAGNOSIS — K21.9 GASTROESOPHAGEAL REFLUX DISEASE, UNSPECIFIED WHETHER ESOPHAGITIS PRESENT: ICD-10-CM

## 2024-11-20 PROCEDURE — 99214 OFFICE O/P EST MOD 30 MIN: CPT | Performed by: INTERNAL MEDICINE

## 2024-11-20 PROCEDURE — 90653 IIV ADJUVANT VACCINE IM: CPT | Performed by: INTERNAL MEDICINE

## 2024-11-20 RX ORDER — SENNOSIDES A AND B 8.6 MG/1
1 TABLET, FILM COATED ORAL 2 TIMES DAILY
Qty: 60 TABLET | Refills: 11 | Status: SHIPPED | OUTPATIENT
Start: 2024-11-20 | End: 2025-11-20

## 2024-11-20 RX ORDER — OMEGA-3 FATTY ACIDS/FISH OIL 300-1000MG
1 CAPSULE ORAL EVERY 6 HOURS PRN
COMMUNITY

## 2024-11-20 NOTE — PROGRESS NOTES
with:  Select all that apply: (!) Walking/Balance  Select all that apply: (!) Housekeeping, Banking/Finances, Shopping, Transportation  Interventions:  See AVS for additional education material                  Objective   Vitals:    11/20/24 1356   BP: (!) 149/74   Pulse: 73   Resp: 15   Temp: 98.2 °F (36.8 °C)   SpO2: 97%   Weight: 66.5 kg (146 lb 9.6 oz)   Height: 1.575 m (5' 2\")      Body mass index is 26.81 kg/m².        General Appearance: alert and oriented to person, place and time, well-developed and well-nourished, in no acute distress  Head: normocephalic and atraumatic  ENT: tympanic membrane, external ear and ear canal normal bilaterally, oropharynx clear and moist with normal mucous membranes  Neck: neck supple and non tender without mass, no thyromegaly or thyroid nodules, no cervical lymphadenopathy   Pulmonary/Chest: clear to auscultation bilaterally- no wheezes, rales or rhonchi, normal air movement, no respiratory distress  Cardiovascular: normal rate, normal S1 and S2, no gallops, intact distal pulses, and no carotid bruits  Abdomen: soft, non-tender, non-distended, normal bowel sounds, no masses or organomegaly  Extremities: no cyanosis and no clubbing  Musculoskeletal: normal range of motion, no joint swelling, deformity or tenderness  Neurologic: Right-sided foot drop.  No acute changes in her exam.           Allergies   Allergen Reactions    Amoxicillin-Pot Clavulanate Other (See Comments)     \"sick\"    Azithromycin      Other reaction(s): Unknown (comments)  \"didn't work\"    Sulfa Antibiotics Hives and Rash     Prior to Visit Medications    Medication Sig Taking? Authorizing Provider   ibuprofen (ADVIL) 200 MG CAPS capsule Take 1 capsule by mouth every 6 hours as needed for Fever Yes Provider, MD Linus   omeprazole (PRILOSEC) 20 MG delayed release capsule Take 1 capsule by mouth every morning (before breakfast) Yes Arnaldo Akers MD   senna (SENOKOT) 8.6 MG tablet Take 1 tablet by

## 2024-11-20 NOTE — PATIENT INSTRUCTIONS
test is done to check for color blindness.  Color vision is often tested as part of a routine exam. You may also have this test when you apply for a job where recognizing different colors is important, such as , electronics, or the .  How are vision tests done?  Visual acuity test   You cover one eye at a time.  You read aloud from a wall chart across the room.  You read aloud from a small card that you hold in your hand.  Refraction   You look into a special device.  The device puts lenses of different strengths in front of each eye to see how strong your glasses or contact lenses need to be.  Visual field tests   Your doctor may have you look through special machines.  Or your doctor may simply have you stare straight ahead while they move a finger into and out of your field of vision.  Color vision test   You look at pieces of printed test patterns in various colors. You say what number or symbol you see.  Your doctor may have you trace the number or symbol using a pointer.  How do these tests feel?  There is very little chance of having a problem from this test. If dilating drops are used for a vision test, they may make the eyes sting and cause a medicine taste in the mouth.  Follow-up care is a key part of your treatment and safety. Be sure to make and go to all appointments, and call your doctor if you are having problems. It's also a good idea to know your test results and keep a list of the medicines you take.  Where can you learn more?  Go to https://www.[employer].net/patientEd and enter G551 to learn more about \"Learning About Vision Tests.\"  Current as of: June 5, 2023  Content Version: 14.2  © 2024 [employer].   Care instructions adapted under license by [employer]. If you have questions about a medical condition or this instruction, always ask your healthcare professional. [employer] disclaims any warranty or liability for your use of this

## 2024-11-21 LAB
25(OH)D3 SERPL-MCNC: 40.7 NG/ML (ref 30–100)
ALBUMIN SERPL-MCNC: 4.3 G/DL (ref 3.5–5)
ALBUMIN/GLOB SERPL: 1.4 (ref 1.1–2.2)
ALP SERPL-CCNC: 87 U/L (ref 45–117)
ALT SERPL-CCNC: 44 U/L (ref 12–78)
ANION GAP SERPL CALC-SCNC: 5 MMOL/L (ref 2–12)
AST SERPL-CCNC: 31 U/L (ref 15–37)
BASOPHILS # BLD: 0 K/UL (ref 0–0.1)
BASOPHILS NFR BLD: 0 % (ref 0–1)
BILIRUB SERPL-MCNC: 0.9 MG/DL (ref 0.2–1)
BUN SERPL-MCNC: 20 MG/DL (ref 6–20)
BUN/CREAT SERPL: 20 (ref 12–20)
CALCIUM SERPL-MCNC: 10.3 MG/DL (ref 8.5–10.1)
CHLORIDE SERPL-SCNC: 107 MMOL/L (ref 97–108)
CO2 SERPL-SCNC: 29 MMOL/L (ref 21–32)
CREAT SERPL-MCNC: 0.99 MG/DL (ref 0.55–1.02)
DIFFERENTIAL METHOD BLD: NORMAL
EOSINOPHIL # BLD: 0.1 K/UL (ref 0–0.4)
EOSINOPHIL NFR BLD: 1 % (ref 0–7)
ERYTHROCYTE [DISTWIDTH] IN BLOOD BY AUTOMATED COUNT: 12.7 % (ref 11.5–14.5)
GLOBULIN SER CALC-MCNC: 3.1 G/DL (ref 2–4)
GLUCOSE SERPL-MCNC: 96 MG/DL (ref 65–100)
HCT VFR BLD AUTO: 39.8 % (ref 35–47)
HGB BLD-MCNC: 13.6 G/DL (ref 11.5–16)
IMM GRANULOCYTES # BLD AUTO: 0 K/UL (ref 0–0.04)
IMM GRANULOCYTES NFR BLD AUTO: 0 % (ref 0–0.5)
LYMPHOCYTES # BLD: 1.9 K/UL (ref 0.8–3.5)
LYMPHOCYTES NFR BLD: 19 % (ref 12–49)
MCH RBC QN AUTO: 29.9 PG (ref 26–34)
MCHC RBC AUTO-ENTMCNC: 34.2 G/DL (ref 30–36.5)
MCV RBC AUTO: 87.5 FL (ref 80–99)
MONOCYTES # BLD: 0.9 K/UL (ref 0–1)
MONOCYTES NFR BLD: 8 % (ref 5–13)
NEUTS SEG # BLD: 7.2 K/UL (ref 1.8–8)
NEUTS SEG NFR BLD: 72 % (ref 32–75)
NRBC # BLD: 0 K/UL (ref 0–0.01)
NRBC BLD-RTO: 0 PER 100 WBC
PLATELET # BLD AUTO: 268 K/UL (ref 150–400)
PMV BLD AUTO: 10 FL (ref 8.9–12.9)
POTASSIUM SERPL-SCNC: 4.1 MMOL/L (ref 3.5–5.1)
PROT SERPL-MCNC: 7.4 G/DL (ref 6.4–8.2)
RBC # BLD AUTO: 4.55 M/UL (ref 3.8–5.2)
SODIUM SERPL-SCNC: 141 MMOL/L (ref 136–145)
TSH SERPL DL<=0.05 MIU/L-ACNC: 1.3 UIU/ML (ref 0.36–3.74)
WBC # BLD AUTO: 10.2 K/UL (ref 3.6–11)

## 2025-01-28 DIAGNOSIS — G35 MULTIPLE SCLEROSIS (HCC): ICD-10-CM

## 2025-01-28 RX ORDER — VALSARTAN 80 MG/1
TABLET ORAL
Qty: 180 TABLET | Refills: 3 | Status: SHIPPED | OUTPATIENT
Start: 2025-01-28

## 2025-01-28 RX ORDER — GABAPENTIN 400 MG/1
400 CAPSULE ORAL 3 TIMES DAILY
Qty: 270 CAPSULE | Refills: 1 | Status: SHIPPED | OUTPATIENT
Start: 2025-01-28 | End: 2025-07-27

## 2025-01-28 NOTE — TELEPHONE ENCOUNTER
Medication Refill Request    Alma CHERYL Trey is requesting a refill of the following medication(s):     Gabapentin (Neurontin) 400 MG capsule  Valsartan (Diovan) 80 MG tablet    Please send refill to:     [facility] [place] - [address] - P [phone] - F [phone]      Padilla from [facility] called requesting refills for the patient.  Advised her that the patient should have refills left, but she said that they did not have any refills on file.

## 2025-01-28 NOTE — TELEPHONE ENCOUNTER
Chief Complaint   Patient presents with    Medication Refill     Last Appointment with Dr. Arnaldo Akers:  11/20/2024   No future appointments.

## 2025-07-09 RX ORDER — TOLTERODINE 4 MG/1
4 CAPSULE, EXTENDED RELEASE ORAL DAILY
Qty: 90 CAPSULE | Refills: 1 | Status: SHIPPED | OUTPATIENT
Start: 2025-07-09

## 2025-07-09 NOTE — TELEPHONE ENCOUNTER
Chief Complaint   Patient presents with    Medication Refill     Last Appointment with Arnaldo Akers MD:  11/20/2024   No future appointments.

## 2025-07-28 RX ORDER — FLUTICASONE PROPIONATE 50 MCG
2 SPRAY, SUSPENSION (ML) NASAL 2 TIMES DAILY PRN
Qty: 48 ML | Refills: 1 | Status: SHIPPED | OUTPATIENT
Start: 2025-07-28

## 2025-09-04 RX ORDER — SCOPOLAMINE 1 MG/3D
1 PATCH, EXTENDED RELEASE TRANSDERMAL
Qty: 4 PATCH | Refills: 0 | Status: SHIPPED | OUTPATIENT
Start: 2025-09-04